# Patient Record
Sex: FEMALE | Race: OTHER | Employment: FULL TIME | URBAN - METROPOLITAN AREA
[De-identification: names, ages, dates, MRNs, and addresses within clinical notes are randomized per-mention and may not be internally consistent; named-entity substitution may affect disease eponyms.]

---

## 2017-05-18 DIAGNOSIS — R51.9 HEADACHE, CHRONIC DAILY: Chronic | ICD-10-CM

## 2017-05-18 DIAGNOSIS — G43.109 MIGRAINE WITH AURA AND WITHOUT STATUS MIGRAINOSUS, NOT INTRACTABLE: Chronic | ICD-10-CM

## 2017-05-19 DIAGNOSIS — G43.109 MIGRAINE WITH AURA AND WITHOUT STATUS MIGRAINOSUS, NOT INTRACTABLE: Chronic | ICD-10-CM

## 2017-05-20 RX ORDER — SUMATRIPTAN 25 MG/1
TABLET, FILM COATED ORAL
Qty: 12 TABLET | Refills: 2 | Status: SHIPPED | OUTPATIENT
Start: 2017-05-20 | End: 2020-01-23

## 2017-05-20 RX ORDER — SUMATRIPTAN 25 MG/1
25 TABLET, FILM COATED ORAL
Qty: 12 TABLET | Refills: 2 | Status: SHIPPED | OUTPATIENT
Start: 2017-05-20 | End: 2018-11-07 | Stop reason: SDUPTHER

## 2017-05-20 RX ORDER — AMITRIPTYLINE HYDROCHLORIDE 25 MG/1
37.5 TABLET, FILM COATED ORAL NIGHTLY
Qty: 45 TABLET | Refills: 4 | Status: SHIPPED | OUTPATIENT
Start: 2017-05-20 | End: 2017-11-17 | Stop reason: SDUPTHER

## 2017-05-24 LAB
CHOLESTEROL/HDL RATIO: 3.8
CHOLESTEROL: 191 MG/DL
GLUCOSE BLD-MCNC: 93 MG/DL (ref 70–99)
HDLC SERPL-MCNC: 50 MG/DL
LDL CHOLESTEROL: 127 MG/DL (ref 0–130)
PATIENT FASTING?: YES
TRIGL SERPL-MCNC: 72 MG/DL
VLDLC SERPL CALC-MCNC: NORMAL MG/DL (ref 1–30)

## 2017-05-25 ENCOUNTER — EMPLOYEE WELLNESS (OUTPATIENT)
Dept: OTHER | Age: 37
End: 2017-05-25

## 2017-09-05 RX ORDER — NORGESTREL AND ETHINYL ESTRADIOL 0.3-0.03MG
KIT ORAL
Qty: 28 TABLET | Refills: 0 | Status: SHIPPED | OUTPATIENT
Start: 2017-09-05 | End: 2017-10-04 | Stop reason: SDUPTHER

## 2017-10-04 RX ORDER — NORGESTREL AND ETHINYL ESTRADIOL 0.3-0.03MG
KIT ORAL
Qty: 28 TABLET | Refills: 0 | Status: SHIPPED | OUTPATIENT
Start: 2017-10-04 | End: 2017-11-02 | Stop reason: SDUPTHER

## 2017-11-02 RX ORDER — NORGESTREL AND ETHINYL ESTRADIOL 0.3-0.03MG
KIT ORAL
Qty: 28 TABLET | Refills: 0 | Status: SHIPPED | OUTPATIENT
Start: 2017-11-02 | End: 2018-01-24 | Stop reason: ALTCHOICE

## 2017-11-09 NOTE — PROGRESS NOTES
Subjective:      Patient ID: Santana Powell is a 44 y.o. female. HPI  Presents for annual pap no complaints   Allergy :None  Med hx: fatigue   Surgery : Tympanoplasty, Colonoscopy, Lasik, Hemorrhoids     Review of Systems   Constitutional: Negative for chills, fatigue and fever. HENT: Negative for sinus pressure, sneezing, sore throat, tinnitus, trouble swallowing and voice change. Eyes: Negative for photophobia and visual disturbance. Respiratory: Negative for cough, shortness of breath, wheezing and stridor. Cardiovascular: Negative for chest pain, palpitations and leg swelling. Gastrointestinal: Negative for abdominal distention, abdominal pain, constipation, diarrhea, nausea and vomiting. Endocrine: Negative for polydipsia, polyphagia and polyuria. Genitourinary: Negative for decreased urine volume, difficulty urinating, dyspareunia, dysuria, enuresis, frequency, genital sores, hematuria, menstrual problem, pelvic pain, vaginal bleeding, vaginal discharge and vaginal pain. Musculoskeletal: Negative for arthralgias, back pain, gait problem, joint swelling and myalgias. Skin: Negative for color change, pallor and rash. Neurological: Negative for dizziness, tremors, syncope, facial asymmetry, speech difficulty, light-headedness, numbness and headaches. Hematological: Negative for adenopathy. Does not bruise/bleed easily. Psychiatric/Behavioral: Negative for agitation, behavioral problems, confusion, decreased concentration, dysphoric mood, hallucinations, self-injury, sleep disturbance and suicidal ideas. The patient is not nervous/anxious and is not hyperactive. Objective:   Physical Exam   Constitutional: She is oriented to person, place, and time. She appears well-developed and well-nourished. HENT:   Head: Normocephalic. Eyes: Conjunctivae are normal. Pupils are equal, round, and reactive to light. Neck: Normal range of motion. No thyromegaly present.    Cardiovascular:

## 2017-11-14 ENCOUNTER — OFFICE VISIT (OUTPATIENT)
Dept: OBGYN CLINIC | Age: 37
End: 2017-11-14
Payer: COMMERCIAL

## 2017-11-14 ENCOUNTER — HOSPITAL ENCOUNTER (OUTPATIENT)
Age: 37
Setting detail: SPECIMEN
Discharge: HOME | End: 2017-11-14
Payer: COMMERCIAL

## 2017-11-14 VITALS
HEART RATE: 76 BPM | WEIGHT: 209 LBS | SYSTOLIC BLOOD PRESSURE: 122 MMHG | BODY MASS INDEX: 29.15 KG/M2 | DIASTOLIC BLOOD PRESSURE: 68 MMHG | RESPIRATION RATE: 18 BRPM

## 2017-11-14 DIAGNOSIS — Z12.31 VISIT FOR SCREENING MAMMOGRAM: ICD-10-CM

## 2017-11-14 DIAGNOSIS — Z01.419 PAP SMEAR, AS PART OF ROUTINE GYNECOLOGICAL EXAMINATION: Primary | ICD-10-CM

## 2017-11-14 PROCEDURE — 99395 PREV VISIT EST AGE 18-39: CPT | Performed by: NURSE PRACTITIONER

## 2017-11-14 ASSESSMENT — ENCOUNTER SYMPTOMS
SORE THROAT: 0
STRIDOR: 0
BACK PAIN: 0
ABDOMINAL DISTENTION: 0
SINUS PRESSURE: 0
NAUSEA: 0
COUGH: 0
SHORTNESS OF BREATH: 0
WHEEZING: 0
CONSTIPATION: 0
VOMITING: 0
PHOTOPHOBIA: 0
ABDOMINAL PAIN: 0
VOICE CHANGE: 0
DIARRHEA: 0
TROUBLE SWALLOWING: 0
COLOR CHANGE: 0

## 2017-11-16 LAB
HPV SAMPLE: NORMAL
HPV SOURCE: NORMAL
HPV, GENOTYPE 16: NOT DETECTED
HPV, GENOTYPE 18: NOT DETECTED
HPV, HIGH RISK OTHER: NOT DETECTED
HPV, INTERPRETATION: NORMAL

## 2017-11-17 DIAGNOSIS — R51.9 HEADACHE, CHRONIC DAILY: Chronic | ICD-10-CM

## 2017-11-17 RX ORDER — AMITRIPTYLINE HYDROCHLORIDE 25 MG/1
37.5 TABLET, FILM COATED ORAL NIGHTLY
Qty: 45 TABLET | Refills: 4 | Status: SHIPPED | OUTPATIENT
Start: 2017-11-17 | End: 2018-01-18 | Stop reason: SDUPTHER

## 2017-11-22 LAB — CYTOLOGY REPORT: NORMAL

## 2017-12-18 ENCOUNTER — HOSPITAL ENCOUNTER (OUTPATIENT)
Dept: MAMMOGRAPHY | Age: 37
Discharge: HOME | End: 2017-12-18
Payer: COMMERCIAL

## 2017-12-18 ENCOUNTER — HOSPITAL ENCOUNTER (OUTPATIENT)
Age: 37
Discharge: HOME | End: 2017-12-18
Payer: COMMERCIAL

## 2017-12-18 DIAGNOSIS — Z12.31 VISIT FOR SCREENING MAMMOGRAM: ICD-10-CM

## 2017-12-18 PROCEDURE — 77063 BREAST TOMOSYNTHESIS BI: CPT

## 2017-12-19 ENCOUNTER — TELEPHONE (OUTPATIENT)
Dept: OBGYN CLINIC | Age: 37
End: 2017-12-19

## 2018-01-18 DIAGNOSIS — R51.9 HEADACHE, CHRONIC DAILY: Primary | Chronic | ICD-10-CM

## 2018-01-18 RX ORDER — AMITRIPTYLINE HYDROCHLORIDE 25 MG/1
37.5 TABLET, FILM COATED ORAL NIGHTLY
Qty: 135 TABLET | Refills: 1 | Status: SHIPPED | OUTPATIENT
Start: 2018-01-18 | End: 2018-08-13 | Stop reason: SDUPTHER

## 2018-01-24 ENCOUNTER — OFFICE VISIT (OUTPATIENT)
Dept: FAMILY MEDICINE CLINIC | Age: 38
End: 2018-01-24
Payer: COMMERCIAL

## 2018-01-24 VITALS
SYSTOLIC BLOOD PRESSURE: 124 MMHG | TEMPERATURE: 100 F | OXYGEN SATURATION: 98 % | WEIGHT: 211 LBS | DIASTOLIC BLOOD PRESSURE: 80 MMHG | HEART RATE: 105 BPM | BODY MASS INDEX: 30.21 KG/M2 | HEIGHT: 70 IN

## 2018-01-24 DIAGNOSIS — R52 BODY ACHES: Primary | ICD-10-CM

## 2018-01-24 DIAGNOSIS — J06.9 VIRAL URI: ICD-10-CM

## 2018-01-24 PROCEDURE — 87804 INFLUENZA ASSAY W/OPTIC: CPT | Performed by: NURSE PRACTITIONER

## 2018-01-24 PROCEDURE — 99213 OFFICE O/P EST LOW 20 MIN: CPT | Performed by: NURSE PRACTITIONER

## 2018-01-24 ASSESSMENT — ENCOUNTER SYMPTOMS
SINUS PRESSURE: 0
VISUAL CHANGE: 0
RHINORRHEA: 0
VOMITING: 0
NAUSEA: 0
CHEST TIGHTNESS: 0
DIARRHEA: 0
EYES NEGATIVE: 1
SINUS PAIN: 0
FLU SYMPTOMS: 1
SHORTNESS OF BREATH: 0
SWOLLEN GLANDS: 0
SORE THROAT: 1
EYE DISCHARGE: 0
COUGH: 1
ALLERGIC/IMMUNOLOGIC NEGATIVE: 1
ABDOMINAL PAIN: 0
EYE ITCHING: 0
CHANGE IN BOWEL HABIT: 0

## 2018-01-24 ASSESSMENT — PATIENT HEALTH QUESTIONNAIRE - PHQ9
SUM OF ALL RESPONSES TO PHQ9 QUESTIONS 1 & 2: 0
1. LITTLE INTEREST OR PLEASURE IN DOING THINGS: 0
SUM OF ALL RESPONSES TO PHQ QUESTIONS 1-9: 0
2. FEELING DOWN, DEPRESSED OR HOPELESS: 0

## 2018-03-20 VITALS — BODY MASS INDEX: 29.43 KG/M2 | WEIGHT: 211 LBS

## 2018-03-28 ENCOUNTER — EMPLOYEE WELLNESS (OUTPATIENT)
Dept: OTHER | Age: 38
End: 2018-03-28

## 2018-03-28 LAB
CHOLESTEROL/HDL RATIO: 3.2
CHOLESTEROL: 191 MG/DL
GLUCOSE BLD-MCNC: 93 MG/DL (ref 70–99)
HDLC SERPL-MCNC: 60 MG/DL
LDL CHOLESTEROL: 121 MG/DL (ref 0–130)
PATIENT FASTING?: NORMAL
TRIGL SERPL-MCNC: 51 MG/DL
VLDLC SERPL CALC-MCNC: NORMAL MG/DL (ref 1–30)

## 2018-04-02 VITALS — WEIGHT: 207 LBS | BODY MASS INDEX: 29.7 KG/M2

## 2018-04-24 ENCOUNTER — OFFICE VISIT (OUTPATIENT)
Dept: OBGYN CLINIC | Age: 38
End: 2018-04-24
Payer: COMMERCIAL

## 2018-04-24 ENCOUNTER — HOSPITAL ENCOUNTER (OUTPATIENT)
Age: 38
Setting detail: SPECIMEN
Discharge: HOME | End: 2018-04-24
Payer: COMMERCIAL

## 2018-04-24 VITALS
BODY MASS INDEX: 29.85 KG/M2 | RESPIRATION RATE: 16 BRPM | DIASTOLIC BLOOD PRESSURE: 70 MMHG | HEIGHT: 70 IN | SYSTOLIC BLOOD PRESSURE: 132 MMHG | WEIGHT: 208.5 LBS

## 2018-04-24 DIAGNOSIS — N94.9 GENITAL LESION, FEMALE: Primary | ICD-10-CM

## 2018-04-24 PROCEDURE — 99213 OFFICE O/P EST LOW 20 MIN: CPT | Performed by: NURSE PRACTITIONER

## 2018-04-24 ASSESSMENT — ENCOUNTER SYMPTOMS
SORE THROAT: 0
BACK PAIN: 0
COUGH: 0
SHORTNESS OF BREATH: 0
TROUBLE SWALLOWING: 0
STRIDOR: 0
NAUSEA: 0
CONSTIPATION: 0
COLOR CHANGE: 0
ABDOMINAL DISTENTION: 0
ABDOMINAL PAIN: 0
DIARRHEA: 0
VOMITING: 0
VOICE CHANGE: 0

## 2018-04-25 ENCOUNTER — TELEPHONE (OUTPATIENT)
Dept: OBGYN CLINIC | Age: 38
End: 2018-04-25

## 2018-04-25 DIAGNOSIS — N94.9 GENITAL LESION, FEMALE: Primary | ICD-10-CM

## 2018-04-25 RX ORDER — CLOBETASOL PROPIONATE 0.5 MG/G
CREAM TOPICAL
Qty: 1 TUBE | Refills: 0 | Status: SHIPPED | OUTPATIENT
Start: 2018-04-25 | End: 2020-01-23

## 2018-04-29 LAB
CULTURE: NORMAL
Lab: NORMAL
SPECIMEN DESCRIPTION: NORMAL
STATUS: NORMAL

## 2018-08-13 DIAGNOSIS — R51.9 HEADACHE, CHRONIC DAILY: Chronic | ICD-10-CM

## 2018-08-13 RX ORDER — AMITRIPTYLINE HYDROCHLORIDE 25 MG/1
37.5 TABLET, FILM COATED ORAL NIGHTLY
Qty: 135 TABLET | Refills: 1 | Status: SHIPPED | OUTPATIENT
Start: 2018-08-13 | End: 2019-02-08 | Stop reason: SDUPTHER

## 2018-09-05 RX ORDER — NORETHINDRONE AND ETHINYL ESTRADIOL 1 MG-35MCG
KIT ORAL
Qty: 28 TABLET | Refills: 11 | Status: SHIPPED | OUTPATIENT
Start: 2018-09-05 | End: 2020-01-23

## 2018-11-07 DIAGNOSIS — G43.109 MIGRAINE WITH AURA AND WITHOUT STATUS MIGRAINOSUS, NOT INTRACTABLE: Chronic | ICD-10-CM

## 2018-11-07 RX ORDER — SUMATRIPTAN 25 MG/1
25 TABLET, FILM COATED ORAL
Qty: 12 TABLET | Refills: 3 | Status: SHIPPED | OUTPATIENT
Start: 2018-11-07 | End: 2020-01-23

## 2018-11-19 ENCOUNTER — OFFICE VISIT (OUTPATIENT)
Dept: OBGYN CLINIC | Age: 38
End: 2018-11-19
Payer: COMMERCIAL

## 2018-11-19 ENCOUNTER — HOSPITAL ENCOUNTER (OUTPATIENT)
Age: 38
Setting detail: SPECIMEN
Discharge: HOME | End: 2018-11-19
Payer: COMMERCIAL

## 2018-11-19 VITALS
RESPIRATION RATE: 16 BRPM | DIASTOLIC BLOOD PRESSURE: 60 MMHG | SYSTOLIC BLOOD PRESSURE: 112 MMHG | WEIGHT: 206 LBS | HEIGHT: 70 IN | BODY MASS INDEX: 29.49 KG/M2

## 2018-11-19 DIAGNOSIS — Z01.419 PAP SMEAR, AS PART OF ROUTINE GYNECOLOGICAL EXAMINATION: Primary | ICD-10-CM

## 2018-11-19 DIAGNOSIS — Z12.31 VISIT FOR SCREENING MAMMOGRAM: ICD-10-CM

## 2018-11-19 PROCEDURE — 99395 PREV VISIT EST AGE 18-39: CPT | Performed by: NURSE PRACTITIONER

## 2018-11-19 ASSESSMENT — ENCOUNTER SYMPTOMS
SORE THROAT: 0
SHORTNESS OF BREATH: 0
COUGH: 0
VOMITING: 0
CONSTIPATION: 0
TROUBLE SWALLOWING: 0
WHEEZING: 0
BACK PAIN: 0
PHOTOPHOBIA: 0
STRIDOR: 0
ABDOMINAL PAIN: 0
DIARRHEA: 0
COLOR CHANGE: 0
NAUSEA: 0
ABDOMINAL DISTENTION: 0
VOICE CHANGE: 0

## 2018-12-04 LAB — CYTOLOGY REPORT: NORMAL

## 2018-12-20 ENCOUNTER — HOSPITAL ENCOUNTER (OUTPATIENT)
Dept: MAMMOGRAPHY | Age: 38
Discharge: HOME | End: 2018-12-22

## 2018-12-20 DIAGNOSIS — Z12.31 VISIT FOR SCREENING MAMMOGRAM: ICD-10-CM

## 2018-12-20 PROCEDURE — 77067 SCR MAMMO BI INCL CAD: CPT

## 2019-02-08 DIAGNOSIS — R51.9 HEADACHE, CHRONIC DAILY: Chronic | ICD-10-CM

## 2019-02-08 RX ORDER — AMITRIPTYLINE HYDROCHLORIDE 25 MG/1
37.5 TABLET, FILM COATED ORAL NIGHTLY
Qty: 135 TABLET | Refills: 1 | Status: SHIPPED | OUTPATIENT
Start: 2019-02-08 | End: 2019-08-12 | Stop reason: SDUPTHER

## 2019-04-25 ENCOUNTER — EMPLOYEE WELLNESS (OUTPATIENT)
Dept: OTHER | Age: 39
End: 2019-04-25

## 2019-04-25 LAB
CHOLESTEROL/HDL RATIO: 3.6
CHOLESTEROL: 197 MG/DL
GLUCOSE BLD-MCNC: 96 MG/DL (ref 70–99)
HDLC SERPL-MCNC: 55 MG/DL
LDL CHOLESTEROL: 129 MG/DL (ref 0–130)
PATIENT FASTING?: NORMAL
TRIGL SERPL-MCNC: 65 MG/DL
VLDLC SERPL CALC-MCNC: NORMAL MG/DL (ref 1–30)

## 2019-05-06 VITALS — WEIGHT: 208 LBS | BODY MASS INDEX: 29.84 KG/M2

## 2019-08-12 ENCOUNTER — OFFICE VISIT (OUTPATIENT)
Dept: PEDIATRIC NEUROLOGY | Age: 39
End: 2019-08-12
Payer: COMMERCIAL

## 2019-08-12 VITALS
HEIGHT: 70 IN | DIASTOLIC BLOOD PRESSURE: 97 MMHG | WEIGHT: 209.4 LBS | SYSTOLIC BLOOD PRESSURE: 139 MMHG | HEART RATE: 88 BPM | BODY MASS INDEX: 29.98 KG/M2

## 2019-08-12 DIAGNOSIS — R51.9 HEADACHE, CHRONIC DAILY: Chronic | ICD-10-CM

## 2019-08-12 DIAGNOSIS — G43.109 MIGRAINE WITH AURA AND WITHOUT STATUS MIGRAINOSUS, NOT INTRACTABLE: Chronic | ICD-10-CM

## 2019-08-12 PROCEDURE — 99211 OFF/OP EST MAY X REQ PHY/QHP: CPT | Performed by: NURSE PRACTITIONER

## 2019-08-12 PROCEDURE — 99213 OFFICE O/P EST LOW 20 MIN: CPT | Performed by: NURSE PRACTITIONER

## 2019-08-12 RX ORDER — AMITRIPTYLINE HYDROCHLORIDE 25 MG/1
37.5 TABLET, FILM COATED ORAL NIGHTLY
Qty: 135 TABLET | Refills: 1 | Status: SHIPPED | OUTPATIENT
Start: 2019-08-12 | End: 2020-01-23

## 2019-08-12 NOTE — LETTER
able to do her daily activities and this does result in interruption of work or other activities at home. There is no associated light or sound sensitivity or neurological deficits with this headache. This type of headache is reported to be constant, with dull tension type of pain. MIGRAINES ASSOCIATED WITH VISION LOSS:  Randy Salas continues to have intermittent migraines 1-2 times every 6 months. Date is unknown of her last migraine with vision loss. Randy Salas approximates the last migraine to occur in between 3-6 months ago. Randy Salas reports that she will take Imitrex when she notices vision changes. She reports seeing a grey spot in her right eye during these episodes. If she take the Imitrex as soon as these changes occur, she is able to abort her migraine. Migraine description provided below:      MIGRAINE DESCRIPTION:  She describe the pain to involve the bifrontal and temporal regions at an intensity of 8/10. Prior to the headache, she has had visual aura consisting of seeing flashing lights. Nausea or vomiting does not accompany the migraines. Randy Salas reports that initially she sees a small black spot, and this gradually gets larger with more vision loss, more so on the left side. She states that sometimes she can stop the progression of vision loss if she takes Aleve right away. She will prefer to go and sleep in a dark, quiet room and rest. She has needed to take 10-20 minute breaks from work at times. There is no associated numbness, tingling or weakness with these headaches. Rest gives partial relief of these headaches. Previous Medications Tried: Topamax (ineffective, irregular menses)     REVIEW OF SYSTEMS:  Constitutional: Negative. Eyes: Negative. Respiratory: Negative. Cardiovascular: Negative. Gastrointestinal: Negative. Genitourinary: Negative. Musculoskeletal: Neck stiffness    Skin: Negative.    Neurological: positive for headaches and migraines, negative for seizures,

## 2019-08-12 NOTE — PROGRESS NOTES
It was a pleasure to see Lacy Yuki at the [facility] at [facility]. She is a 43 y.o. female  to this visit for a follow up neurological evaluation. INTERIM PROGRESS:  FATIGUE:  Cherlynn Closs states that she has noticed an overall improvement with her Fatigue. She reports that the fatigue has been manageable on Elavil. She states that since starting the medication she has been able to overcome the exhaustion and has been more motivated. She has been exercising several times per week. Cherlynn Closs reports that she has been sleeping well at least 8 hours a night. Cherlynn Closs reports that she can have [employer] fog\" on some occasions. It is to be recalled that a  previous sleep study revealed frequent arousals throughout the night without any significant apnea associated. Cherlynn Closs reports that she continues to have upper back and neck pain on some occasions. She continues to use massage therapy in this regard. She has seen rheumatology in this regard, which did not reveal a clear etiology into her diagnosis. Cherlynn Closs remains on Elavil with no reported side effects. HEADACHES:   Cherlynn Closs states that she has 1-2 a month or less. This is an improvement from in the past when she would have headaches 3-4 times a week. Cherlynn Closs states that she will take Naprosyn and rest which will typically provide relief. Headache description below:        HEADACHE DESCRIPTION:   These headaches are of a low-moderate intensity, pressure type, occurring in the bifrontal, temporal regions as well as neck and back pain. She is able to do her daily activities and this does result in interruption of work or other activities at home. There is no associated light or sound sensitivity or neurological deficits with this headache. This type of headache is reported to be constant, with dull tension type of pain. MIGRAINES ASSOCIATED WITH VISION LOSS:  Cherlynn Closs continues to have intermittent migraines 1-2 times every 6 months.  Date 30.08 kg/m²     Neurological: she is alert and has normal strength and normal reflexes. she displays no atrophy, no tremor and normal reflexes. No cranial nerve deficit or sensory deficit. she exhibits normal muscle tone. she can stand and walk. she displays no seizure activity. Rudy Pichardo was polite and cooperative for the exam.     Reflex Scores: 2+ diffuse. No focal weakness noted on exam.     Nursing note and vitals reviewed. Constitutional: she appears well-developed and well-nourished. HENT: Mouth/Throat: Mucous membranes are moist.   Eyes: EOM are normal. Pupils are equal, round, and reactive to light. Neck: Normal range of motion. Neck supple. Cardiovascular: Regular rhythm, S1 normal and S2 normal.   Pulmonary/Chest: Effort normal and breath sounds normal.   Lymph Nodes: No significant lymphadenopathy noted. Musculoskeletal: Normal range of motion. Neurological: she is alert and rest of the exam is as mentioned above. Skin: Skin is warm and dry. No lesions or ulcers. RECORD REVIEW: Previous medical records were reviewed at today's visit. DIAGNOSTIC STUDIES:  8/21/14 - MRI Brain - Normal    Ref.  Range 8/8/2014 08:21   Sodium Latest Range: 133-142 mmol/L 134   Potassium Latest Range: 3.5-5.1 mmol/L 4.2   Chloride Latest Range:  mmol/L 98   CO2 Latest Range: 20-31 mmol/L 22   Anion Gap Latest Range: 8-16 mmol/L 18 (H)   Glucose Latest Range: 70-99 mg/dL 93   BUN Latest Range: 6-20 mg/dL 9   Creatinine Latest Range: 0.50-0.90 mg/dL 0.67   Alk Phos Latest Range:  U/L 75   Calcium Latest Range: 8.6-10.0 mg/dL 9.5   Total Protein Latest Range: 6.4-8.3 g/dL 7.7   Albumin Latest Range: 3.5-5.2 g/dL 4.1   ALT Latest Range: 5-33 U/L 18   AST Latest Range: <32 U/L 21   Bilirubin, Direct Latest Range: <0.31 mg/dL 0.10   Bilirubin, Indirect Latest Range: 0.00-1.00 mg/dL 0.30   Bilirubin Latest Range: 0.3-1.2 mg/dL 0.40   Albumin/Globulin Ratio Latest Range: 1.7-2.5  1.1 (L)   Chol/HDL Ratio Latest Range: <5  4.1   HDL Cholesterol Latest Range: >40 mg/dL 52   LDL Cholesterol Latest Range: 0-130 mg/dL 137 (H)   Triglycerides Latest Range: <150 mg/dL 123   Cholesterol Latest Range: <200 mg/dL 214 (H)   Ferritin Latest Range:  ug/L 56   Vit D, 1,25-Dihydroxy Latest Range: 15-75 pg/mL 119 (H)   T3, Free Latest Range: 2.02-4.43 pg/mL 3.97   Thyroxine, Free Latest Range: 0.93-1.70 ng/dL 1.05   TSH Latest Range: 0.30-5.00 mIU/L 2.17   WBC Latest Range: 3.5-11.0 k/uL 7.9   RBC Latest Range: 4.0-5.2 m/uL 4.59   Hemoglobin Quant Latest Range: 12.0-16.0 g/dL 13.1   Hematocrit Latest Range: 36-46 % 38.5   RDW Latest Range: 12.5-15.4 % 13.8   Platelet Count Latest Range: 140-450 k/uL 277   Sed Rate Latest Range: 0-20 mm 18   [facility] Latest Range: NEG  POSITIVE (A)   Anti ds DNA Latest Range: <100 IU/mL 141 (H)   Histone AB Latest Range: <100 U/mL 45   Anti RM-1 Latest Range: <100 U/mL 19   Anti-Smith Latest Range: <100 U/mL 22   Rheumatoid Factor Latest Range: <14  <10.0   Anti SSA Latest Range: <100 U/mL 33   Anti SSB Latest Range: <100 U/mL 18   Anti-Scleroderma Latest Range: <100 U/mL 71   Anti RNP Latest Range: <100 U/mL 35   [facility] Reference Range: No range found Pend   Anti-Centromere Latest Range: <100 U/mL 9   IGA Latest Range:  mg/dL 148   Total IgG Latest Range: 700-1600 mg/dL 1439   IgM Latest Range:  mg/dL 203      Assessment:   Ijeoma Hernandez is a 27 y.o. female with:   1. Fatigue, lack of concentration, and persistent feeling of delayed thought processing. The fatigue has improved and I will continue to have a watchful approach. 2. Chronic daily tension related headaches which have shown improvement on Elavil. 3. Migraines with aura (associated with partial vision loss). The Imitrex helps but it takes around 20 minutes for this migraine to get aborted. 4. Concentration Deficit issues on some occasions. 5. Upper back region muscle tightness      Plan:   1.  Continue Amitriptyline  37.5 mg

## 2019-10-26 ENCOUNTER — OFFICE VISIT (OUTPATIENT)
Dept: FAMILY MEDICINE CLINIC | Age: 39
End: 2019-10-26
Payer: COMMERCIAL

## 2019-10-26 VITALS — HEART RATE: 76 BPM | TEMPERATURE: 97.7 F | SYSTOLIC BLOOD PRESSURE: 137 MMHG | DIASTOLIC BLOOD PRESSURE: 96 MMHG

## 2019-10-26 DIAGNOSIS — J01.90 ACUTE BACTERIAL SINUSITIS: Primary | ICD-10-CM

## 2019-10-26 DIAGNOSIS — B96.89 ACUTE BACTERIAL SINUSITIS: Primary | ICD-10-CM

## 2019-10-26 PROCEDURE — 99213 OFFICE O/P EST LOW 20 MIN: CPT | Performed by: PHYSICIAN ASSISTANT

## 2019-10-26 RX ORDER — AMOXICILLIN 875 MG/1
875 TABLET, COATED ORAL 2 TIMES DAILY
Qty: 20 TABLET | Refills: 0 | Status: SHIPPED | OUTPATIENT
Start: 2019-10-26 | End: 2021-05-18

## 2019-10-26 RX ORDER — FLUTICASONE PROPIONATE 50 MCG
2 SPRAY, SUSPENSION (ML) NASAL DAILY
Qty: 1 BOTTLE | Refills: 0 | Status: SHIPPED | OUTPATIENT
Start: 2019-10-26 | End: 2020-01-23

## 2019-10-26 RX ORDER — PREDNISONE 20 MG/1
20 TABLET ORAL 2 TIMES DAILY
Qty: 10 TABLET | Refills: 0 | Status: SHIPPED | OUTPATIENT
Start: 2019-10-26 | End: 2019-10-31

## 2019-10-31 ASSESSMENT — ENCOUNTER SYMPTOMS
SORE THROAT: 1
RHINORRHEA: 1
CHEST TIGHTNESS: 0
STRIDOR: 0
EYES NEGATIVE: 1
SINUS PRESSURE: 1
SHORTNESS OF BREATH: 0
COUGH: 1
GASTROINTESTINAL NEGATIVE: 1
WHEEZING: 0
SINUS PAIN: 1

## 2020-01-23 ENCOUNTER — HOSPITAL ENCOUNTER (OUTPATIENT)
Age: 40
Setting detail: SPECIMEN
Discharge: HOME | End: 2020-01-23
Payer: COMMERCIAL

## 2020-01-23 ENCOUNTER — OFFICE VISIT (OUTPATIENT)
Dept: OBGYN CLINIC | Age: 40
End: 2020-01-23
Payer: COMMERCIAL

## 2020-01-23 VITALS — WEIGHT: 204 LBS | SYSTOLIC BLOOD PRESSURE: 120 MMHG | DIASTOLIC BLOOD PRESSURE: 64 MMHG | BODY MASS INDEX: 29.3 KG/M2

## 2020-01-23 PROCEDURE — 99396 PREV VISIT EST AGE 40-64: CPT | Performed by: NURSE PRACTITIONER

## 2020-01-23 ASSESSMENT — ENCOUNTER SYMPTOMS
COLOR CHANGE: 0
SHORTNESS OF BREATH: 0
DIARRHEA: 0
NAUSEA: 0
RHINORRHEA: 0
CONSTIPATION: 0
COUGH: 0
VOMITING: 0
ABDOMINAL PAIN: 0
BACK PAIN: 0

## 2020-01-23 NOTE — PATIENT INSTRUCTIONS
Patient Education      Patient Education        Learning About Breast Cancer Screening  What is breast cancer screening? Breast cancer occurs when cells that are not normal grow in one or both of your breasts. Screening tests can help find breast cancer early. Cancer is easier to treat when it's found early. Having concerns about breast cancer is common. That's why it's important to talk with your doctor about when to start and how often to get screened for breast cancer. How is breast cancer screening done? Several screening tests can be used to check for breast cancer. · Mammograms check for signs of cancer using X-rays. They can show tumors that are too small for you or your doctor to feel. During a mammogram, a machine squeezes your breasts to make them flatter and easier to X-ray. At least two pictures are taken of each breast. One is taken from the top and one from the side. · 3-D mammograms are also called digital breast tomosynthesis. Your breast is positioned on a flat plate. A top plate is pressed against your breast to keep it in position. The X-ray arm then moves in an arc above the breast and takes many pictures. A computer uses these X-rays to create a three-dimensional image. · Clinical breast exams are a doctor's exam. Your doctor carefully feels your breasts and under your arms to check for lumps or other changes. After the screening, your doctor will tell you the results. You will also be told if you need any follow-up tests. When should you get screened? Talk with your doctor about when you should start being tested for breast cancer. How often you get tested and the kind of tests you get will depend on your age and your risk. The guidelines that follow are for women who have an average risk for breast cancer.  If you have a higher risk for breast cancer, such as having a family history of breast cancer in multiple relatives or at a young age, your doctor may recommend different screening for you. · Ages 21 to 44: Some experts recommend that women have a clinical breast exam every 3 years, starting at age 21. Ask your doctor how often you should have this test. If you have a high risk for breast cancer, talk with your doctor about when to start yearly mammograms and other screening tests. · Ages 36 and older: Talk with your doctor about how often you should have mammograms and clinical breast exams. What is your risk for breast cancer? If you don't already know your risk of breast cancer, you can ask your doctor about it. You can also look it up at www.cancer.gov/bcrisktool/. If your doctor says that you have a high or very high risk, ask about ways to reduce your risk. These could include getting extra screening, taking medicine, or having surgery. If you have a strong family history of breast cancer, ask your doctor about genetic testing. What steps can you take to stay healthy? Some things that increase your risk of breast cancer, such as your age and being female, cannot be controlled. But you can do some things to stay as healthy as you can. · Learn what your breasts normally look and feel like. If you notice any changes, tell your doctor. · If you drink alcohol, limit how much you drink. Any amount of alcohol may increase your risk for some types of cancer. · If you smoke, quit. When you quit smoking, you lower your chances of getting many types of cancer. You can also do your best to eat well, be active, and stay at a healthy weight. Eating healthy foods and being active every day, as well as staying at a healthy weight, may help prevent cancer. Where can you learn more? Go to [email or web address].[email or web address] org and sign in to your Adan account. Enter C424 in the Quantuvis box to learn more about \"Learning About Breast Cancer Screening. \"     If you do not have an account, please click on the \"Sign Up Now\" link.   Current as of: August 21, cervix. Sometimes the lab cannot use the sample because it does not contain enough cells or was not preserved well. If so, you may need to have the test again. This is not common, but it does happen from time to time. When should you call for help? Watch closely for changes in your health, and be sure to contact your doctor if:    · You have vaginal bleeding or pain for more than 2 days after the test. It is normal to have a small amount of bleeding for a day or two after the test.   Where can you learn more? Go to [email or web address] and sign in to your SyMynd account. Enter G329 in the Cellity box to learn more about \"Pap Test: Care Instructions. \"     If you do not have an account, please click on the \"Sign Up Now\" link. Current as of: August 21, 2019  Content Version: 12.3  © 0096-1518 Healthwise, Incorporated. Care instructions adapted under license by [facility] ([facility]). If you have questions about a medical condition or this instruction, always ask your healthcare professional. [address] any warranty or liability for your use of this information.

## 2020-01-23 NOTE — PROGRESS NOTES
Lawerence Prader is a 35 y.o.  here for her annual exam.  The patient was seen and examined. The patients past medical, surgical, social and family history were reviewed. Current medications and allergies were reviewed, and documented in the chart. 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 She is engaged. Exercise Yes  Diet Yes  Tobacco abuse No    Last PAP:11/19/18-negative, hx of abnormal PAP yes - ASCUS +hpv  Family hx uterine or ovarian cancer-denies  Last mammogram- 12/20/18-negative Family hx of breast cancer -[facility]   family hx colon cancer -MGF        Sexually active: yes - with fiance, multiple partners: No, Dyspareunia: No, Vaginal discharge: no, positive itching though past few days, denies odor  UTI symptoms: no, voiding difficulties: no, bowels regular:Yes bloating:no      Menstrual history:  Menarche age- 14 ,   LMP- 1/2/20  Birth control:   She stopped her oral contraceptive in Fall 2019 thinks October or November d/t if missed one would have BTB, she states that she has had periods every 20 days since and lasting 7 days not super heavy or cramping, thought related to periods just regulating after stopping bcp. She is also somewhat concerned about her age and conceiving and is having some issues with fiphillip where she is considering talking to fertility specialist she will let s us know what she decides discussed AMA risks    denies hx of blood clot or clotting disorder. Denies chest pain or pressure, SOB, calf pain or swelling, headaches, or vision changes. OB History   No obstetric history on file.        Vitals:    01/23/20 1432   BP: 120/64   Site: Left Upper Arm   Position: Sitting   Cuff Size: Medium Adult   Weight: 204 lb (92.5 kg)       Wt Readings from Last 3 Encounters:   01/23/20 204 lb (92.5 kg)   08/12/19 209 lb 6.4 oz (95 kg)   04/25/19 208 lb (94.3 kg)     Past Medical History:   Diagnosis Date    Fatigue     Hemorrhoid Past Surgical History:   Procedure Laterality Date    COLONOSCOPY  4-1-16    hemorrhoids    EYE SURGERY Bilateral     LISIK    HEMORRHOID SURGERY  10/14/2016    hemorrhoidectomy    LASIK Bilateral     TYMPANOSTOMY TUBE PLACEMENT      x 3 school age     Family History   Problem Relation Age of Onset    High Blood Pressure Father     Schizophrenia Father     OCD Father     Macular Degen Maternal Grandmother     Colon Cancer Maternal Grandfather     Heart Attack Maternal Grandfather     Stroke Paternal Grandmother     Breast Cancer Paternal Grandmother     Heart Attack Paternal Grandfather      Social History     Tobacco Use   Smoking Status Never Smoker   Smokeless Tobacco Never Used     Social History     Substance and Sexual Activity   Alcohol Use Yes    Comment: sociably        Social History     Tobacco History     Smoking Status  Never Smoker    Smokeless Tobacco Use  Never Used          Alcohol History     Alcohol Use Status  Yes Comment  sociably          Drug Use     Drug Use Status  No          Sexual Activity     Sexually Active  Not Asked              No Known Allergies  Current Outpatient Medications   Medication Sig Dispense Refill    amoxicillin (AMOXIL) 875 MG tablet Take 1 tablet by mouth 2 times daily 20 tablet 0     No current facility-administered medications for this visit. Subjective:     Review of Systems   Constitutional: Negative for chills, fatigue, fever and unexpected weight change. HENT: Negative for congestion and rhinorrhea. Eyes: Negative for visual disturbance. Respiratory: Negative for cough and shortness of breath. Cardiovascular: Negative for chest pain, palpitations and leg swelling. Gastrointestinal: Negative for abdominal pain, constipation, diarrhea, nausea and vomiting. Endocrine: Negative for cold intolerance, heat intolerance, polydipsia and polyuria. Genitourinary: Positive for menstrual problem.  Negative for RAHUL  10/14/2016: HEMORRHOID SURGERY      Comment:  hemorrhoidectomy  No date: LASIK; Bilateral  No date: TYMPANOSTOMY TUBE PLACEMENT      Comment:  x 3 school age  Medications/Contraceptives Affecting Cytology     Combination Contraceptives - Oral Disp Start End     NORTREL 1/35, 28, 1-35 MG-MCG per tablet    28 tablet 9/5/2018     Sig: TAKE ONE TABLET BY MOUTH DAILY. Patient not taking:  Reported on 8/12/2019                 Social History    Tobacco Use      Smoking status: Never Smoker      Smokeless tobacco: Never Used       Standing Status:   Future     Standing Expiration Date:   1/23/2021     Order Specific Question:   Collection Type     Answer: Thin Prep     Order Specific Question:   Prior Abnormal Pap Test     Answer:   No     Order Specific Question:   Screening or Diagnostic     Answer:   Screening     Order Specific Question:   HPV Requested? Answer:   Yes     Order Specific Question:   High Risk Patient     Answer:   N/A     No orders of the defined types were placed in this encounter. Patient given educational materials - seepatient instructions. Discussed use, benefit, and side effects of prescribed medications. All patient questions answered. Pt voiced understanding. Reviewed health maintenance. Instructed to continue current medications, diet and exercise. Patient agreedwith treatment plan. Follow up as directed.       Electronically signed by LENNY Dominguez CNP on 1/23/2020at 3:51 PM

## 2020-01-24 LAB
CHLAMYDIA BY THIN PREP: NEGATIVE
DIRECT EXAM: NORMAL
Lab: NORMAL
N. GONORRHOEAE DNA, THIN PREP: NEGATIVE
SPECIMEN DESCRIPTION: NORMAL
SPECIMEN DESCRIPTION: NORMAL

## 2020-01-28 LAB
HPV SAMPLE: NORMAL
HPV, GENOTYPE 16: NOT DETECTED
HPV, GENOTYPE 18: NOT DETECTED
HPV, HIGH RISK OTHER: NOT DETECTED
HPV, INTERPRETATION: NORMAL
SPECIMEN DESCRIPTION: NORMAL

## 2020-02-04 LAB — CYTOLOGY REPORT: NORMAL

## 2020-03-11 ENCOUNTER — OFFICE VISIT (OUTPATIENT)
Dept: OBGYN CLINIC | Age: 40
End: 2020-03-11
Payer: COMMERCIAL

## 2020-03-11 ENCOUNTER — HOSPITAL ENCOUNTER (OUTPATIENT)
Age: 40
Setting detail: SPECIMEN
Discharge: HOME | End: 2020-03-11
Payer: COMMERCIAL

## 2020-03-11 VITALS
DIASTOLIC BLOOD PRESSURE: 72 MMHG | WEIGHT: 205.38 LBS | RESPIRATION RATE: 16 BRPM | SYSTOLIC BLOOD PRESSURE: 120 MMHG | BODY MASS INDEX: 30.42 KG/M2 | HEIGHT: 69 IN

## 2020-03-11 LAB
DIRECT EXAM: ABNORMAL
Lab: ABNORMAL
SPECIMEN DESCRIPTION: ABNORMAL

## 2020-03-11 PROCEDURE — 99213 OFFICE O/P EST LOW 20 MIN: CPT | Performed by: NURSE PRACTITIONER

## 2020-03-11 ASSESSMENT — ENCOUNTER SYMPTOMS
VOMITING: 0
SHORTNESS OF BREATH: 0
COLOR CHANGE: 0
NAUSEA: 0
CHEST TIGHTNESS: 0

## 2020-03-11 NOTE — PROGRESS NOTES
[facility] PHYSICIANS  [facility] [address]  [address] [address]  Dept: [phone]  Dept Fax: [phone]    Lavelle Roa is a 35 y.o. female who presents today for her medical conditions/complaintsas noted below. Lavelle Roa is c/o of Vaginal Discharge        HPI:     HPI  Pt is here with complaints of a vaginal discharge for around 2 weeks  with no Odor, no  Itching,  positive Burning. No UTI sx, no pelvic pain, fevers chills or n/v.  Did note lesion/bumps to left labial area seem to have improved. She tried monistat OTC helped slightly then returned. Denies hx STI   No recent antibiotics, changes in soaps. No change in partners- denies partner having symptoms. LMP- 3.5 -4 weeks ago      Past Medical History:   Diagnosis Date    Fatigue     Hemorrhoid       Past Surgical History:   Procedure Laterality Date    COLONOSCOPY  4-1-16    hemorrhoids    EYE SURGERY Bilateral     LISIK    HEMORRHOID SURGERY  10/14/2016    hemorrhoidectomy    LASIK Bilateral     TYMPANOSTOMY TUBE PLACEMENT      x 3 school age       Family History   Problem Relation Age of Onset    High Blood Pressure Father     Schizophrenia Father     OCD Father     Macular Degen Maternal Grandmother     Colon Cancer Maternal Grandfather     Heart Attack Maternal Grandfather     Stroke Paternal Grandmother     Breast Cancer Paternal Grandmother     Heart Attack Paternal Grandfather        Social History     Tobacco Use    Smoking status: Never Smoker    Smokeless tobacco: Never Used   Substance Use Topics    Alcohol use: Yes     Comment: sociably      Current Outpatient Medications   Medication Sig Dispense Refill    amoxicillin (AMOXIL) 875 MG tablet Take 1 tablet by mouth 2 times daily 20 tablet 0     No current facility-administered medications for this visit.       No Known Allergies    Health Maintenance   Topic Date Due    HIV screen  08/26/1994    DTaP/Tdap/Td vaccine (1 - Tdap) 08/26/1998    Lipid screen  04/25/2024    Cervical cancer screen  01/23/2025    Shingles Vaccine (1 of 2) 08/26/2029    Flu vaccine  Completed    Hepatitis A vaccine  Aged Out    Hepatitis B vaccine  Aged Out    Hib vaccine  Aged Out    Meningococcal (ACWY) vaccine  Aged Out    Pneumococcal 0-64 years Vaccine  Aged Out       Subjective:     Review of Systems   Constitutional: Negative for chills, fatigue and fever. Eyes: Negative for visual disturbance. Respiratory: Negative for chest tightness and shortness of breath. Cardiovascular: Negative for chest pain and palpitations. Gastrointestinal: Negative for nausea and vomiting. Genitourinary: Positive for vaginal discharge and vaginal pain. Negative for dyspareunia, dysuria, flank pain, frequency, menstrual problem, pelvic pain and vaginal bleeding. Musculoskeletal: Negative for neck pain and neck stiffness. Skin: Negative for color change and pallor. Neurological: Negative for dizziness and light-headedness. Hematological: Negative for adenopathy. Does not bruise/bleed easily. Psychiatric/Behavioral: Negative for self-injury and suicidal ideas. Objective:     Physical Exam  Vitals signs and nursing note reviewed. Constitutional:       General: She is not in acute distress. Appearance: She is well-developed. She is not diaphoretic. HENT:      Head: Normocephalic and atraumatic. Right Ear: External ear normal.      Left Ear: External ear normal.      Nose: Nose normal.   Eyes:      Pupils: Pupils are equal, round, and reactive to light. Neck:      Musculoskeletal: Normal range of motion and neck supple. Cardiovascular:      Rate and Rhythm: Normal rate and regular rhythm. Heart sounds: Normal heart sounds. No murmur. No friction rub. No gallop. Pulmonary:      Effort: Pulmonary effort is normal.      Breath sounds: Normal breath sounds. No wheezing.    Abdominal:      General: Bowel sounds are normal. Palpations: Abdomen is soft. Abdomen is not rigid. Tenderness: There is no abdominal tenderness. There is no guarding or rebound. Hernia: There is no hernia in the right inguinal area or left inguinal area. Genitourinary:     Labia:         Right: No rash, tenderness, lesion or injury. Left: Lesion present. No rash, tenderness or injury. Vagina: No signs of injury and foreign body. Vaginal discharge (yellow) present. No erythema, tenderness or bleeding. Cervix: No cervical motion tenderness, discharge or friability. Uterus: Not enlarged and not tender. Adnexa:         Right: No mass, tenderness or fullness. Left: No mass, tenderness or fullness. Musculoskeletal: Normal range of motion. Skin:     General: Skin is warm and dry. Findings: No rash. Neurological:      Mental Status: She is alert and oriented to person, place, and time. Cranial Nerves: No cranial nerve deficit. Psychiatric:         Behavior: Behavior normal.         Thought Content: Thought content normal.         Judgment: Judgment normal.       /72 (Site: Left Upper Arm, Position: Sitting, Cuff Size: Large Adult)   Resp 16   Ht 5' 9\" (1.753 m)   Wt 205 lb 6 oz (93.2 kg)   BMI 30.33 kg/m²     Assessment:       Diagnosis Orders   1. Vaginal discharge  VAGINITIS DNA PROBE    Chlamydia Trachomatis & Neisseria gonorrhoeae (GC) by amplified detection   2. Labial lesion  Herpes Simplex 1 & 2, Molecular       Pelvic exam completed cultures obtained and sent    Plan:      Vaginal discharge- check cultures tx if positive. Encouraged probiotic dailyNo CMT tenderness or pelvic tenderness with exam, denies consitutional symptoms. Monitor for fevers, chills, n/v, abdominal/pelvic let us know if occurs. Encouraged  safe sex practices. Labial lesion- check hsv discussed possibly excoriation? Denies known hx hsv in self or partner.      Return if symptoms worsen or fail to improve. Orders Placed This Encounter   Procedures    VAGINITIS DNA PROBE     Standing Status:   Future     Standing Expiration Date:   3/11/2021    Chlamydia Trachomatis & Neisseria gonorrhoeae (GC) by amplified detection     Standing Status:   Future     Standing Expiration Date:   3/11/2021     Order Specific Question:   Source: Answer:   Cervical    Herpes Simplex 1 & 2, Molecular     Type for 1 and 2     Standing Status:   Future     Standing Expiration Date:   3/11/2021         Patient given educational materials - seepatient instructions. Discussed use, benefit, and side effects of prescribed medications. All patient questions answered. Pt voiced understanding. Reviewed health maintenance. Instructed to continue current medications, diet and exercise. Patient agreedwith treatment plan. Follow up as directed. Electronically signed by LENNY Dominguez CNP on 3/11/2020at 1:20 PM      Of the 15 minute duration appointment visit, Xin Lockhart CNP spent at least 50% of the face-to-face time in counseling, explanation of diagnosis, planning of further management, and answering all questions.

## 2020-03-12 LAB
C TRACH DNA GENITAL QL NAA+PROBE: NEGATIVE
HSV 1, NAAT: NEGATIVE
HSV 2, NAAT: NEGATIVE
N. GONORRHOEAE DNA: NEGATIVE
SPECIMEN DESCRIPTION: NORMAL
SPECIMEN DESCRIPTION: NORMAL

## 2020-03-12 RX ORDER — METRONIDAZOLE 500 MG/1
500 TABLET ORAL ONCE
Qty: 4 TABLET | Refills: 0 | Status: SHIPPED | OUTPATIENT
Start: 2020-03-12 | End: 2020-03-23 | Stop reason: SDUPTHER

## 2020-03-12 RX ORDER — METRONIDAZOLE 500 MG/1
500 TABLET ORAL 2 TIMES DAILY
Qty: 14 TABLET | Refills: 0 | Status: CANCELLED | OUTPATIENT
Start: 2020-03-12 | End: 2020-03-19

## 2020-03-12 NOTE — TELEPHONE ENCOUNTER
Please notify her that  she is positive for trichomonas she needs to be treated with flagyl 2gm once, inform may cause nausea, no alcohol when she takes this,  this is an STD she needs to notify her partner/s, and they should be treated she should have ZAK completed at least 2 weeks after treatment completed and within 3 months.      Her GC and HSV was negative

## 2020-03-13 ENCOUNTER — PATIENT MESSAGE (OUTPATIENT)
Dept: OBGYN CLINIC | Age: 40
End: 2020-03-13

## 2020-03-23 ENCOUNTER — PATIENT MESSAGE (OUTPATIENT)
Dept: OBGYN CLINIC | Age: 40
End: 2020-03-23

## 2020-03-23 RX ORDER — METRONIDAZOLE 500 MG/1
500 TABLET ORAL ONCE
Qty: 4 TABLET | Refills: 0 | Status: SHIPPED | OUTPATIENT
Start: 2020-03-23 | End: 2020-03-23

## 2020-03-23 RX ORDER — METRONIDAZOLE 500 MG/1
500 TABLET ORAL ONCE
Qty: 4 TABLET | Refills: 0 | Status: SHIPPED | OUTPATIENT
Start: 2020-03-23 | End: 2020-03-23 | Stop reason: SDUPTHER

## 2020-03-23 NOTE — TELEPHONE ENCOUNTER
From: Jason Cortes  To: LENNY Monaco - CNP  Sent: 3/23/2020 8:26 AM EDT  Subject: Visit Follow-Up Question    There seemed to be an improvement of symptoms several days after taking the flagyl, however I had my period last week so it was harder to tell but I didn't notice the discharge. Now that it has ended this morning I notice a small amount if the greenish/yellowish discharge again. Would this warrant another course of antibiotics? I didn't want to wait too long to ask, and hopefully I haven't already.   Thank you  Jason Cortes

## 2020-04-22 ENCOUNTER — HOSPITAL ENCOUNTER (OUTPATIENT)
Age: 40
Setting detail: SPECIMEN
Discharge: HOME | End: 2020-04-22
Payer: COMMERCIAL

## 2020-04-22 ENCOUNTER — OFFICE VISIT (OUTPATIENT)
Dept: OBGYN CLINIC | Age: 40
End: 2020-04-22
Payer: COMMERCIAL

## 2020-04-22 VITALS — WEIGHT: 205 LBS | BODY MASS INDEX: 30.27 KG/M2 | SYSTOLIC BLOOD PRESSURE: 118 MMHG | DIASTOLIC BLOOD PRESSURE: 62 MMHG

## 2020-04-22 LAB
DIRECT EXAM: NORMAL
Lab: NORMAL
SPECIMEN DESCRIPTION: NORMAL

## 2020-04-22 PROCEDURE — 99213 OFFICE O/P EST LOW 20 MIN: CPT | Performed by: NURSE PRACTITIONER

## 2020-04-22 ASSESSMENT — ENCOUNTER SYMPTOMS
COLOR CHANGE: 0
WHEEZING: 0
VOMITING: 0
NAUSEA: 0
SHORTNESS OF BREATH: 0

## 2020-04-22 NOTE — PATIENT INSTRUCTIONS
on the genitals or mouth. · Having one sex partner (who does not have STIs and does not have sex with anyone else) is a good way to avoid STIs. When should you call for help? Call your doctor now or seek immediate medical care if:    · You have unusual vaginal bleeding.     · You have a fever.     · You have new discharge from the vagina or penis.     · You have pelvic pain.    Watch closely for changes in your health, and be sure to contact your doctor if:    · You do not get better as expected.     · You have any new symptoms or your symptoms get worse. Where can you learn more? Go to https://chpepiceweb.[facility]. org and sign in to your [employer] account. Enter B268 in the Rolith box to learn more about \"Trichomoniasis: Care Instructions. \"     If you do not have an account, please click on the \"Sign Up Now\" link. Current as of: July 7, 2019Content Version: 12.4  © 5680-5345 Healthwise, Incorporated. Care instructions adapted under license by [facility] [address] If you have questions about a medical condition or this instruction, always ask your healthcare professional. [place] [address] any warranty or liability for your use of this information.

## 2020-11-22 ENCOUNTER — OFFICE VISIT (OUTPATIENT)
Dept: PRIMARY CARE CLINIC | Age: 40
End: 2020-11-22
Payer: COMMERCIAL

## 2020-11-22 VITALS
HEIGHT: 70 IN | TEMPERATURE: 98.4 F | SYSTOLIC BLOOD PRESSURE: 130 MMHG | OXYGEN SATURATION: 98 % | DIASTOLIC BLOOD PRESSURE: 88 MMHG | WEIGHT: 190 LBS | BODY MASS INDEX: 27.2 KG/M2 | HEART RATE: 89 BPM

## 2020-11-22 PROCEDURE — 99214 OFFICE O/P EST MOD 30 MIN: CPT | Performed by: FAMILY MEDICINE

## 2020-11-22 RX ORDER — PREDNISONE 20 MG/1
40 TABLET ORAL DAILY
Qty: 10 TABLET | Refills: 0 | Status: SHIPPED | OUTPATIENT
Start: 2020-11-22 | End: 2020-11-27

## 2020-11-22 ASSESSMENT — ENCOUNTER SYMPTOMS
SHORTNESS OF BREATH: 0
COUGH: 0
WHEEZING: 0
VOMITING: 0
NAUSEA: 0
ABDOMINAL PAIN: 0
DIARRHEA: 0
RHINORRHEA: 0
SORE THROAT: 0

## 2020-11-22 ASSESSMENT — PATIENT HEALTH QUESTIONNAIRE - PHQ9
1. LITTLE INTEREST OR PLEASURE IN DOING THINGS: 0
SUM OF ALL RESPONSES TO PHQ QUESTIONS 1-9: 0
2. FEELING DOWN, DEPRESSED OR HOPELESS: 0
SUM OF ALL RESPONSES TO PHQ9 QUESTIONS 1 & 2: 0
SUM OF ALL RESPONSES TO PHQ QUESTIONS 1-9: 0
SUM OF ALL RESPONSES TO PHQ QUESTIONS 1-9: 0

## 2020-11-22 NOTE — PATIENT INSTRUCTIONS
Patient Education        Hives: Care Instructions  Your Care Instructions  Hives are raised, red, itchy patches of skin. They are also called wheals or welts. They usually have red borders and pale centers. Hives range in size from ¼ inch to 3 inches or more across. They may seem to move from place to place on the skin. Several hives may form a large area of raised, red skin. You can get hives after an insect sting, after taking medicine or eating certain foods, or because of infection or stress. Other causes include plants, things you breathe in, makeup, heat, cold, sunlight, and latex. You cannot spread hives to other people. Hives may last a few minutes or a few days, but a single spot may last less than 36 hours. Follow-up care is a key part of your treatment and safety. Be sure to make and go to all appointments, and call your doctor if you are having problems. It's also a good idea to know your test results and keep a list of the medicines you take. How can you care for yourself at home? · Avoid whatever you think may have caused your hives, such as a certain food or medicine. However, you may not know the cause. · Put a cool, wet towel on the area to relieve itching. · Take an over-the-counter antihistamine, such as diphenhydramine (Benadryl), cetirizine (Zyrtec), or loratadine (Claritin), to help stop the hives and calm the itching. Read and follow directions on the label. These medicines can make you feel sleepy. Do not drive while using them. · Stay away from strong soaps, detergents, and chemicals. These can make itching worse. When should you call for help? Call 911 anytime you think you may need emergency care. For example, call if:    · You have symptoms of a severe allergic reaction. These may include:  ? Sudden raised, red areas (hives) all over your body. ? Swelling of the throat, mouth, lips, or tongue. ? Trouble breathing. ? Passing out (losing consciousness).  Or you may feel very lightheaded or suddenly feel weak, confused, or restless. Call your doctor now or seek immediate medical care if:    · You have symptoms of an allergic reaction, such as:  ? A rash or hives (raised, red areas on the skin). ? Itching. ? Swelling. ? Belly pain, nausea, or vomiting.     · You get hives after you start a new medicine.     · Hives have not gone away after 24 hours. Watch closely for changes in your health, and be sure to contact your doctor if:    · You do not get better as expected. Where can you learn more? Go to https://3 day BlindspepicWeb and Rankeb.[employer]. org and sign in to your [employer] account. Enter A385 in the Nistica box to learn more about \"Hives: Care Instructions. \"     If you do not have an account, please click on the \"Sign Up Now\" link. Current as of: June 26, 2019               Content Version: 12.6  © 2465-5940 [employer], Incorporated. Care instructions adapted under license by [facility] [employer] [facility] ([facility]). If you have questions about a medical condition or this instruction, always ask your healthcare professional. [place] [address] any warranty or liability for your use of this information. Continue antihistamine as needed.  If rash appears again my try taking steroid  If symptoms worsen or do not improve please follow-up with PCP or return to clinic

## 2020-11-22 NOTE — PROGRESS NOTES
[address]  [address] [id number]  Dept: [phone]  Dept Fax: [phone]    Simon Chin is a 38 y.o. female who presents today for her medical conditions/complaintsas noted below. Simon Chin is c/o of Rash (itchy,rash/hives,x2 days, started under both arms, took benadryl to help)        HPI:     Rash   This is a new problem. The current episode started in the past 7 days (couple days). The problem has been waxing and waning since onset. The rash is diffuse. The rash is characterized by redness, itchiness and swelling. It is unknown if there was an exposure to a precipitant. Pertinent negatives include no congestion, cough, diarrhea, fever, rhinorrhea, shortness of breath, sore throat or vomiting. Past treatments include antihistamine (benadryl, cold shower). The treatment provided mild relief. There is no history of allergies, asthma or eczema.    Sensitive to bug bites but reaction is more localized  No known sick contacts  Past Medical History:   Diagnosis Date    Fatigue     Hemorrhoid     Past medical history reviewed and pertinent positives/negatives in the HPI    Past Surgical History:   Procedure Laterality Date    COLONOSCOPY  4-1-16    hemorrhoids    EYE SURGERY Bilateral     LISIK    HEMORRHOID SURGERY  10/14/2016    hemorrhoidectomy    LASIK Bilateral     TYMPANOSTOMY TUBE PLACEMENT      x 3 school age       Family History   Problem Relation Age of Onset    High Blood Pressure Father     Schizophrenia Father     OCD Father     Macular Degen Maternal Grandmother     Colon Cancer Maternal Grandfather     Heart Attack Maternal Grandfather     Stroke Paternal Grandmother     Breast Cancer Paternal Grandmother     Heart Attack Paternal Grandfather        Social History     Tobacco Use    Smoking status: Never Smoker    Smokeless tobacco: Never Used   Substance Use Topics    Alcohol use: Yes     Comment: sociably Current Outpatient Medications   Medication Sig Dispense Refill    predniSONE (DELTASONE) 20 MG tablet Take 2 tablets by mouth daily for 5 days 10 tablet 0    amoxicillin (AMOXIL) 875 MG tablet Take 1 tablet by mouth 2 times daily (Patient not taking: Reported on 11/22/2020) 20 tablet 0     No current facility-administered medications for this visit. No Known Allergies    Health Maintenance   Topic Date Due    HIV screen  08/26/1994    DTaP/Tdap/Td vaccine (1 - Tdap) 08/26/1998    Lipid screen  04/25/2024    Cervical cancer screen  01/23/2025    Flu vaccine  Completed    Hepatitis A vaccine  Aged Out    Hepatitis B vaccine  Aged Out    Hib vaccine  Aged Out    Meningococcal (ACWY) vaccine  Aged Out    Pneumococcal 0-64 years Vaccine  Aged Out       :      Review of Systems   Constitutional: Negative for chills and fever. HENT: Negative for congestion, ear pain, rhinorrhea and sore throat. Respiratory: Negative for cough, shortness of breath and wheezing. Gastrointestinal: Negative for abdominal pain, diarrhea, nausea and vomiting. Musculoskeletal: Negative for myalgias. Skin: Positive for rash. Negative for pallor. Hematological: Negative for adenopathy. Objective:     Physical Exam  Vitals signs and nursing note reviewed. Constitutional:       Appearance: Normal appearance. HENT:      Head: Normocephalic and atraumatic. Right Ear: Hearing normal.      Left Ear: Hearing normal.      Mouth/Throat:      Lips: Pink. Eyes:      Extraocular Movements: Extraocular movements intact. Conjunctiva/sclera: Conjunctivae normal.   Cardiovascular:      Pulses: Normal pulses. Pulmonary:      Effort: Pulmonary effort is normal.   Skin:     General: Skin is warm and dry. Capillary Refill: Capillary refill takes less than 2 seconds. Findings: Rash (currently only couple 1cm areas of erythema/swelling on arms.  Patient also had pictures of past couple days with large areas of erythema and swelling diffusely over trunk and limbs) present. Neurological:      Mental Status: She is alert and oriented to person, place, and time. Mental status is at baseline. Psychiatric:         Mood and Affect: Mood normal.         Behavior: Behavior normal.         Thought Content: Thought content normal.         Judgment: Judgment normal.       /88   Pulse 89   Temp 98.4 °F (36.9 °C) (Infrared)   Ht 5' 10\" (1.778 m)   Wt 190 lb (86.2 kg)   SpO2 98%   BMI 27.26 kg/m²     Assessment:       Diagnosis Orders   1. Urticaria         Plan:    Continue antihistamine as needed. If rash appears again my try taking steroid as prescribed. If symptoms worsen or do not improve please follow-up with PCP or return to clinic        No orders of the defined types were placed in this encounter. Orders Placed This Encounter   Medications    predniSONE (DELTASONE) 20 MG tablet     Sig: Take 2 tablets by mouth daily for 5 days     Dispense:  10 tablet     Refill:  0      Patient given educational materials - see patient instructions. Discussed use, benefit, and side effects of prescribed medications. All patient questions answered. Pt voiced understanding. Patient agreed with treatment plan. Follow up as directed.      Electronicallysigned by Tima Cat MD on 11/22/2020 at 12:19 PM

## 2021-01-27 ENCOUNTER — OFFICE VISIT (OUTPATIENT)
Dept: PEDIATRIC NEUROLOGY | Age: 41
End: 2021-01-27
Payer: COMMERCIAL

## 2021-01-27 ENCOUNTER — HOSPITAL ENCOUNTER (OUTPATIENT)
Age: 41
Discharge: HOME | End: 2021-01-27
Payer: COMMERCIAL

## 2021-01-27 VITALS
TEMPERATURE: 97.9 F | BODY MASS INDEX: 27.99 KG/M2 | DIASTOLIC BLOOD PRESSURE: 103 MMHG | SYSTOLIC BLOOD PRESSURE: 147 MMHG | HEIGHT: 69 IN | WEIGHT: 189 LBS | HEART RATE: 84 BPM

## 2021-01-27 DIAGNOSIS — R53.83 FATIGUE, UNSPECIFIED TYPE: Chronic | ICD-10-CM

## 2021-01-27 DIAGNOSIS — M79.10 MYALGIA: Primary | ICD-10-CM

## 2021-01-27 DIAGNOSIS — G43.109 MIGRAINE WITH AURA AND WITHOUT STATUS MIGRAINOSUS, NOT INTRACTABLE: Chronic | ICD-10-CM

## 2021-01-27 PROCEDURE — 99214 OFFICE O/P EST MOD 30 MIN: CPT | Performed by: NURSE PRACTITIONER

## 2021-01-27 RX ORDER — MAGNESIUM OXIDE 400 MG/1
400 TABLET ORAL DAILY
COMMUNITY
End: 2021-05-18

## 2021-01-27 RX ORDER — MULTIVIT-MIN/IRON/FOLIC ACID/K 18-600-40
2000 CAPSULE ORAL DAILY
COMMUNITY
End: 2021-05-18

## 2021-01-27 NOTE — PATIENT INSTRUCTIONS
Plan:   1. Restart Amitriptyline  12.5 mg QHS. 2. Continue CoQ 10 200 mg daily. 3.  I would recommend blood work including CBC, CMP, Vitamin D, Ferritin, Vitamin D, TSH, T4, Urinalysis, abiel, antiphospholipid antibodies, esr levels. 4. Monitor your blood pressure twice daily for the next week. 5. Headache log was recommended to be maintained. 6. Naproxen 250 mg at onset of acute headache is recommended. It can be repeated in 6 hours if needed. 7. Migraine abortive medication is also recommended. Imitrex 25 mg tablets, take one tablet at onset of migraine. It is ok to use another dose after two hours if the migraine continues to persist.    8. I would recommend follow up in 1 months.

## 2021-01-27 NOTE — PROGRESS NOTES
states that she has not had a migraine since the last visit. The date of her last migraine is unknown. Ramy Pfeiffer states that she will take Imitrex when she notices vision changes which will typically provide relief. During these episode she will see a spot in her right eye. If she takes Imitrex as soon as these changes occur, she is able to abort the migraine. She denies any nausea or vomiting. Migraine description provided below:      MIGRAINE DESCRIPTION:  She describe the pain to involve the bifrontal and temporal regions at an intensity of 8/10. Prior to the headache, she has had visual aura consisting of seeing flashing lights. Nausea or vomiting does not accompany the migraines. Ramy Pfeiffer reports that initially she sees a small black spot, and this gradually gets larger with more vision loss, more so on the left side. She states that sometimes she can stop the progression of vision loss if she takes Aleve right away. She will prefer to go and sleep in a dark, quiet room and rest. She has needed to take 10-20 minute breaks from work at times. There is no associated numbness, tingling or weakness with these headaches. Rest gives partial relief of these headaches. Previous Medications Tried: Topamax (ineffective, irregular menses)     REVIEW OF SYSTEMS:  Constitutional: Negative. Eyes: Negative. Respiratory: Negative. Cardiovascular: Negative. Gastrointestinal: Negative. Genitourinary: Negative. Musculoskeletal: Neck stiffness    Skin: Negative. Neurological: positive for headaches and migraines, negative for seizures, negative for developmental delays, positive for fatigue and dizziness. Hematological: Negative. Psychiatric/Behavioral: negative for behavioral issues, positive for ADD. All other systems reviewed and are negative. Past, social, family, and developmental history was reviewed and unchanged.      Objective:   PHYSICAL EXAM:   BP (!) 147/103 (Site: Right Upper Arm, Position: Sitting, Cuff Size: Large Adult)   Pulse 84   Temp 97.9 °F (36.6 °C)   Ht 5' 9.17\" (1.757 m)   Wt 189 lb (85.7 kg)   BMI 27.77 kg/m²    BP Rechecked 143/95     Neurological: she is alert and has normal strength and normal reflexes. she displays no atrophy, no tremor and normal reflexes. No cranial nerve deficit or sensory deficit. she exhibits normal muscle tone. she can stand and walk. she displays no seizure activity. Reflex Scores: 2+ diffuse. No focal weakness noted on exam.     Nursing note and vitals reviewed. Constitutional: she appears well-developed and well-nourished. HENT: Mouth/Throat: Mucous membranes are moist.   Eyes: EOM are normal. Pupils are equal, round, and reactive to light. Neck: Normal range of motion. Neck supple. Cardiovascular: Regular rhythm, S1 normal and S2 normal.   Pulmonary/Chest: Effort normal and breath sounds normal.   Lymph Nodes: No significant lymphadenopathy noted. Musculoskeletal: Normal range of motion. Neurological: she is alert and rest of the exam is as mentioned above. Skin: Skin is warm and dry. No lesions or ulcers. RECORD REVIEW: Previous medical records were reviewed at today's visit. DIAGNOSTIC STUDIES:  8/21/14 - MRI Brain - Normal    Ref.  Range 8/8/2014 08:21   Sodium Latest Range: 133-142 mmol/L 134   Potassium Latest Range: 3.5-5.1 mmol/L 4.2   Chloride Latest Range:  mmol/L 98   CO2 Latest Range: 20-31 mmol/L 22   Anion Gap Latest Range: 8-16 mmol/L 18 (H)   Glucose Latest Range: 70-99 mg/dL 93   BUN Latest Range: 6-20 mg/dL 9   Creatinine Latest Range: 0.50-0.90 mg/dL 0.67   Alk Phos Latest Range:  U/L 75   Calcium Latest Range: 8.6-10.0 mg/dL 9.5   Total Protein Latest Range: 6.4-8.3 g/dL 7.7   Albumin Latest Range: 3.5-5.2 g/dL 4.1   ALT Latest Range: 5-33 U/L 18   AST Latest Range: <32 U/L 21   Bilirubin, Direct Latest Range: <0.31 mg/dL 0.10   Bilirubin, Indirect Latest Range: 0.00-1.00 mg/dL 0.30   Bilirubin Latest Range: 0.3-1.2 mg/dL 0.40   Albumin/Globulin Ratio Latest Range: 1.7-2.5  1.1 (L)   Chol/HDL Ratio Latest Range: <5  4.1   HDL Cholesterol Latest Range: >40 mg/dL 52   LDL Cholesterol Latest Range: 0-130 mg/dL 137 (H)   Triglycerides Latest Range: <150 mg/dL 123   Cholesterol Latest Range: <200 mg/dL 214 (H)   Ferritin Latest Range:  ug/L 56   Vit D, 1,25-Dihydroxy Latest Range: 15-75 pg/mL 119 (H)   T3, Free Latest Range: 2.02-4.43 pg/mL 3.97   Thyroxine, Free Latest Range: 0.93-1.70 ng/dL 1.05   TSH Latest Range: 0.30-5.00 mIU/L 2.17   WBC Latest Range: 3.5-11.0 k/uL 7.9   RBC Latest Range: 4.0-5.2 m/uL 4.59   Hemoglobin Quant Latest Range: 12.0-16.0 g/dL 13.1   Hematocrit Latest Range: 36-46 % 38.5   RDW Latest Range: 12.5-15.4 % 13.8   Platelet Count Latest Range: 140-450 k/uL 277   Sed Rate Latest Range: 0-20 mm 18   [facility] Latest Range: NEG  POSITIVE (A)   Anti ds DNA Latest Range: <100 IU/mL 141 (H)   Histone AB Latest Range: <100 U/mL 45   Anti RM-1 Latest Range: <100 U/mL 19   Anti-Smith Latest Range: <100 U/mL 22   Rheumatoid Factor Latest Range: <14  <10.0   Anti SSA Latest Range: <100 U/mL 33   Anti SSB Latest Range: <100 U/mL 18   Anti-Scleroderma Latest Range: <100 U/mL 71   Anti RNP Latest Range: <100 U/mL 35   [facility] Reference Range: No range found Pend   Anti-Centromere Latest Range: <100 U/mL 9   IGA Latest Range:  mg/dL 148   Total IgG Latest Range: 700-1600 mg/dL 1439   IgM Latest Range:  mg/dL 203      Assessment:   Misti Tim is a 38 y.o. female with:   1. Fatigue, Myalgia and recent history of rash. She has a positive [facility] test in the past and seen rheumatology with no clear cut diagnosis. I would like to repeat labs in this regard. Differential includes Lupus. 2. Chronic daily tension related headaches which have shown improvement on Elavil. 3. Migraines with aura (associated with partial vision loss).   The Imitrex helps but it takes around 20 minutes for this migraine to get aborted. 4. Concentration Deficit issues on some occasions. 5. Elevated blood pressure. Will continue to monitor. Plan:   1. Restart Amitriptyline  12.5 mg QHS. 2. Continue CoQ 10 200 mg daily. 3.  I would recommend blood work including CBC, CMP, Vitamin D, Ferritin, Vitamin D, TSH, T4, Urinalysis,  Leelee levels. 4. Monitor your blood pressure twice daily for the next week. 5. Headache log was recommended to be maintained. 6. Naproxen 250 mg at onset of acute headache is recommended. It can be repeated in 6 hours if needed. 7. Migraine abortive medication is also recommended. Imitrex 25 mg tablets, take one tablet at onset of migraine. It is ok to use another dose after two hours if the migraine continues to persist.    8. I would recommend follow up in 1 months.     Electronically signed by LENNY Melton CNP on 1/27/2021 at 11:27 AM

## 2021-01-27 NOTE — LETTER
[facility]   [address]  [place], [address] [place] [address] Street  Phone: [phone]  [phone]      1/31/2021      Julius Cunningham, [address] [address] [place]  [id number]    Patient: Pavel Cornelius  YOB: 1980  Date of Visit: 1/27/2021   MRN:  [id number]      Dear Dr. Evon Connor,      It was a pleasure to see Pavel Cornelius at the Pediatric Neurology Clinic at [facility]. She is a 38 y.o. female  to this visit for a follow up neurological evaluation. INTERIM PROGRESS:  FATIGUE:  Viktoria Anaya states that the fatigue continues to persist and is a concern. Viktoria Anaya states that the fatigue has been worse over the past month. She states that she has had a lot of muscle pain in her upper back, shoulders, neck and chest wall. She continues to use massage therapy in this regard. She states that it has not been effective. She feels that the muscle discomfort is much worse than it has been in the past.  Viktoria Anaya states that she has been under more stress recently. She is eating regularly. States approximately 1 month ago she had a rash all over her body. She was treated in the urgent care for hives. She states since the rash developed she has been more tired and fatigued. Viktoria Anaya has had a positive K[facility]ISER titer in the past and has seen rheumatology with no clear cut diagnosis. Viktoria Anaya weaned herself from Elavil a few months ago. HEADACHES:   Viktoria Anaya headaches have shown them. She states that she is intimately 1 time a month or less. This is an improvement from the past when she would have headaches related given states that she will can at the onset of a headache which will typically provide relief within a few hour.  Headache description below:        HEADACHE DESCRIPTION: These headaches are of a low-moderate intensity, pressure type, occurring in the bifrontal, temporal regions as well as neck and back pain. She is able to do her daily activities and this does result in interruption of work or other activities at home. There is no associated light or sound sensitivity or neurological deficits with this headache. This type of headache is reported to be constant, with dull tension type of pain. MIGRAINES ASSOCIATED WITH VISION LOSS:  Mirian Ho continues to have intermittent migraines. She states that she has not had a migraine since the last visit. The date of her last migraine is unknown. Mirian Ho states that she will take Imitrex when she notices vision changes which will typically provide relief. During these episode she will see a spot in her right eye. If she takes Imitrex as soon as these changes occur, she is able to abort the migraine. She denies any nausea or vomiting. Migraine description provided below:      MIGRAINE DESCRIPTION:  She describe the pain to involve the bifrontal and temporal regions at an intensity of 8/10. Prior to the headache, she has had visual aura consisting of seeing flashing lights. Nausea or vomiting does not accompany the migraines. Mirian Ho reports that initially she sees a small black spot, and this gradually gets larger with more vision loss, more so on the left side. She states that sometimes she can stop the progression of vision loss if she takes Aleve right away. She will prefer to go and sleep in a dark, quiet room and rest. She has needed to take 10-20 minute breaks from work at times. There is no associated numbness, tingling or weakness with these headaches. Rest gives partial relief of these headaches. Previous Medications Tried: Topamax (ineffective, irregular menses)     REVIEW OF SYSTEMS:  Constitutional: Negative. Eyes: Negative. Respiratory: Negative. Cardiovascular: Negative. Gastrointestinal: Negative. Genitourinary: Negative. Musculoskeletal: Neck stiffness    Skin: Negative. Neurological: positive for headaches and migraines, negative for seizures, negative for developmental delays, positive for fatigue and dizziness. Hematological: Negative. Psychiatric/Behavioral: negative for behavioral issues, positive for ADD. All other systems reviewed and are negative. Past, social, family, and developmental history was reviewed and unchanged. Objective:   PHYSICAL EXAM:   BP (!) 147/103 (Site: Right Upper Arm, Position: Sitting, Cuff Size: Large Adult)   Pulse 84   Temp 97.9 °F (36.6 °C)   Ht 5' 9.17\" (1.757 m)   Wt 189 lb (85.7 kg)   BMI 27.77 kg/m²    BP Rechecked 143/95     Neurological: she is alert and has normal strength and normal reflexes. she displays no atrophy, no tremor and normal reflexes. No cranial nerve deficit or sensory deficit. she exhibits normal muscle tone. she can stand and walk. she displays no seizure activity. Reflex Scores: 2+ diffuse. No focal weakness noted on exam.     Nursing note and vitals reviewed. Constitutional: she appears well-developed and well-nourished. HENT: Mouth/Throat: Mucous membranes are moist.   Eyes: EOM are normal. Pupils are equal, round, and reactive to light. Neck: Normal range of motion. Neck supple. Cardiovascular: Regular rhythm, S1 normal and S2 normal.   Pulmonary/Chest: Effort normal and breath sounds normal.   Lymph Nodes: No significant lymphadenopathy noted. Musculoskeletal: Normal range of motion. Neurological: she is alert and rest of the exam is as mentioned above. Skin: Skin is warm and dry. No lesions or ulcers. RECORD REVIEW: Previous medical records were reviewed at today's visit. DIAGNOSTIC STUDIES:  8/21/14 - MRI Brain - Normal    Ref.  Range 8/8/2014 08:21   Sodium Latest Range: 133-142 mmol/L 134   Potassium Latest Range: 3.5-5.1 mmol/L 4.2   Chloride Latest Range:  mmol/L 98 CO2 Latest Range: 20-31 mmol/L 22   Anion Gap Latest Range: 8-16 mmol/L 18 (H)   Glucose Latest Range: 70-99 mg/dL 93   BUN Latest Range: 6-20 mg/dL 9   Creatinine Latest Range: 0.50-0.90 mg/dL 0.67   Alk Phos Latest Range:  U/L 75   Calcium Latest Range: 8.6-10.0 mg/dL 9.5   Total Protein Latest Range: 6.4-8.3 g/dL 7.7   Albumin Latest Range: 3.5-5.2 g/dL 4.1   ALT Latest Range: 5-33 U/L 18   AST Latest Range: <32 U/L 21   Bilirubin, Direct Latest Range: <0.31 mg/dL 0.10   Bilirubin, Indirect Latest Range: 0.00-1.00 mg/dL 0.30   Bilirubin Latest Range: 0.3-1.2 mg/dL 0.40   Albumin/Globulin Ratio Latest Range: 1.7-2.5  1.1 (L)   Chol/HDL Ratio Latest Range: <5  4.1   HDL Cholesterol Latest Range: >40 mg/dL 52   LDL Cholesterol Latest Range: 0-130 mg/dL 137 (H)   Triglycerides Latest Range: <150 mg/dL 123   Cholesterol Latest Range: <200 mg/dL 214 (H)   Ferritin Latest Range:  ug/L 56   Vit D, 1,25-Dihydroxy Latest Range: 15-75 pg/mL 119 (H)   T3, Free Latest Range: 2.02-4.43 pg/mL 3.97   Thyroxine, Free Latest Range: 0.93-1.70 ng/dL 1.05   TSH Latest Range: 0.30-5.00 mIU/L 2.17   WBC Latest Range: 3.5-11.0 k/uL 7.9   RBC Latest Range: 4.0-5.2 m/uL 4.59   Hemoglobin Quant Latest Range: 12.0-16.0 g/dL 13.1   Hematocrit Latest Range: 36-46 % 38.5   RDW Latest Range: 12.5-15.4 % 13.8   Platelet Count Latest Range: 140-450 k/uL 277   Sed Rate Latest Range: 0-20 mm 18   [facility] Latest Range: NEG  POSITIVE (A)   Anti ds DNA Latest Range: <100 IU/mL 141 (H)   Histone AB Latest Range: <100 U/mL 45   Anti RM-1 Latest Range: <100 U/mL 19   Anti-Smith Latest Range: <100 U/mL 22   Rheumatoid Factor Latest Range: <14  <10.0   Anti SSA Latest Range: <100 U/mL 33   Anti SSB Latest Range: <100 U/mL 18   Anti-Scleroderma Latest Range: <100 U/mL 71   Anti RNP Latest Range: <100 U/mL 35   [facility] Reference Range: No range found Pend   Anti-Centromere Latest Range: <100 U/mL 9   IGA Latest Range:  mg/dL 148 Total IgG Latest Range: 700-1600 mg/dL 1439   IgM Latest Range:  mg/dL 203      Assessment:   Mercedes Chaney is a 38 y.o. female with:   1. Fatigue, Myalgia and recent history of rash. She has a positive LEELEE test in the past and seen rheumatology with no clear cut diagnosis. I would like to repeat labs in this regard. Differential includes Lupus. 2. Chronic daily tension related headaches which have shown improvement on Elavil. 3. Migraines with aura (associated with partial vision loss). The Imitrex helps but it takes around 20 minutes for this migraine to get aborted. 4. Concentration Deficit issues on some occasions. 5. Elevated blood pressure. Will continue to monitor. Plan:   1. Restart Amitriptyline  12.5 mg QHS. 2. Continue CoQ 10 200 mg daily. 3.  I would recommend blood work including CBC, CMP, Vitamin D, Ferritin, Vitamin D, TSH, T4, Urinalysis,  Leelee levels. 4. Monitor your blood pressure twice daily for the next week. 5. Headache log was recommended to be maintained. 6. Naproxen 250 mg at onset of acute headache is recommended. It can be repeated in 6 hours if needed. 7. Migraine abortive medication is also recommended. Imitrex 25 mg tablets, take one tablet at onset of migraine. It is ok to use another dose after two hours if the migraine continues to persist.    8. I would recommend follow up in 1 months. Electronically signed by LENNY Robertson CNP on 1/27/2021 at 11:27 AM            If you have any questions or concerns, please feel free to call me. Thank you again for referring this patient to be seen in our clinic.     Sincerely,        Tova Mcneill CNP

## 2021-03-19 DIAGNOSIS — R53.83 FATIGUE, UNSPECIFIED TYPE: Primary | Chronic | ICD-10-CM

## 2021-03-26 ENCOUNTER — HOSPITAL ENCOUNTER (OUTPATIENT)
Age: 41
Discharge: HOME | End: 2021-03-26
Payer: COMMERCIAL

## 2021-03-26 DIAGNOSIS — R53.83 FATIGUE, UNSPECIFIED TYPE: ICD-10-CM

## 2021-03-26 DIAGNOSIS — R53.83 FATIGUE, UNSPECIFIED TYPE: Primary | ICD-10-CM

## 2021-03-26 PROCEDURE — 36415 COLL VENOUS BLD VENIPUNCTURE: CPT

## 2021-03-26 PROCEDURE — 86147 CARDIOLIPIN ANTIBODY EA IG: CPT

## 2021-03-29 LAB
ANTICARDIOLIPIN IGA ANTIBODY: 3.6 APL (ref 0–14)
ANTICARDIOLIPIN IGG ANTIBODY: 3.1 GPL (ref 0–10)
CARDIOLIPIN AB IGM: 29 MPL (ref 0–10)

## 2021-04-01 NOTE — RESULT ENCOUNTER NOTE
I discussed results with patient and recommended further workup for lupus or autoimmune disorder with rheumatologist.

## 2021-04-26 DIAGNOSIS — R53.83 FATIGUE, UNSPECIFIED TYPE: Primary | ICD-10-CM

## 2021-04-26 DIAGNOSIS — M79.10 MYALGIA: ICD-10-CM

## 2021-04-26 DIAGNOSIS — G43.109 MIGRAINE WITH AURA AND WITHOUT STATUS MIGRAINOSUS, NOT INTRACTABLE: ICD-10-CM

## 2021-05-18 ENCOUNTER — HOSPITAL ENCOUNTER (OUTPATIENT)
Age: 41
Setting detail: SPECIMEN
Discharge: HOME | End: 2021-05-18
Payer: COMMERCIAL

## 2021-05-18 ENCOUNTER — OFFICE VISIT (OUTPATIENT)
Dept: OBGYN CLINIC | Age: 41
End: 2021-05-18
Payer: COMMERCIAL

## 2021-05-18 VITALS — BODY MASS INDEX: 29.39 KG/M2 | DIASTOLIC BLOOD PRESSURE: 62 MMHG | WEIGHT: 200 LBS | SYSTOLIC BLOOD PRESSURE: 118 MMHG

## 2021-05-18 DIAGNOSIS — Z12.31 VISIT FOR SCREENING MAMMOGRAM: ICD-10-CM

## 2021-05-18 DIAGNOSIS — R53.83 FATIGUE, UNSPECIFIED TYPE: ICD-10-CM

## 2021-05-18 DIAGNOSIS — N92.0 MENORRHAGIA WITH REGULAR CYCLE: ICD-10-CM

## 2021-05-18 DIAGNOSIS — Z01.419 VISIT FOR GYNECOLOGIC EXAMINATION: Primary | ICD-10-CM

## 2021-05-18 DIAGNOSIS — M79.10 MYALGIA: ICD-10-CM

## 2021-05-18 DIAGNOSIS — G43.109 MIGRAINE WITH AURA AND WITHOUT STATUS MIGRAINOSUS, NOT INTRACTABLE: ICD-10-CM

## 2021-05-18 PROCEDURE — 99396 PREV VISIT EST AGE 40-64: CPT | Performed by: OBSTETRICS & GYNECOLOGY

## 2021-05-18 ASSESSMENT — ENCOUNTER SYMPTOMS
RHINORRHEA: 0
SHORTNESS OF BREATH: 0
VOMITING: 0
BACK PAIN: 0
ABDOMINAL PAIN: 0
COUGH: 0
COLOR CHANGE: 0
NAUSEA: 0
CONSTIPATION: 0
DIARRHEA: 0

## 2021-05-18 NOTE — PROGRESS NOTES
Maco Don is a 38 y.o.  here for her annual exam.  The patient was seen and examined. The patients past medical, surgical, social and family history were reviewed. Current medications and allergies were reviewed, and documented in the chart. She is employed through [employer] [place] [facility] [place] She is inquiring on [facility] referral to discuss sperm donor and possible IVF. Discussed ovarian reserve and discussed risk of AMA and possible need for genetic preimplantation testing. Referral provided    Exercise Yes  Diet Yes  Tobacco abuse No    Last PAP:1/2020 -negative, hx of abnormal PAP yes - ASCUS +hpv  Family hx uterine or ovarian cancer-denies  Last mammogram- 12/20/18-negative Family hx of breast cancer -[facility]M   family hx colon cancer -MGF        Sexually active: yes, multiple partners: No, Dyspareunia: No, Vaginal discharge: no, positive itching though past few days, denies odor  UTI symptoms: no, voiding difficulties: no, bowels regular:Yes bloating:no      Menstrual history:  Menarche age- 14 ,   LMP- 1/2/20  Birth control:   She stopped her oral contraceptive in Fall 2019, she states that she has had periods every 28 days since and lasting 7 days that are very heavy with clots, thought related to periods just regulating after stopping bcp. She is also somewhat concerned about her age and conceiving. she is considering talking to fertility specialist she will let s us know what she decides discussed AMA risks    denies hx of blood clot or clotting disorder. Denies chest pain or pressure, SOB, calf pain or swelling, headaches, or vision changes. OB History   No obstetric history on file.        Vitals:    05/18/21 1358   BP: 118/62   Site: Right Upper Arm   Position: Sitting   Cuff Size: Medium Adult   Weight: 200 lb (90.7 kg)       Wt Readings from Last 3 Encounters:   05/18/21 200 lb (90.7 kg)   01/27/21 189 lb (85.7 kg)   11/22/20 190 lb (86.2 kg)     Past Medical History:   Diagnosis Date    Fatigue     Hemorrhoid                                                                    Past Surgical History:   Procedure Laterality Date    COLONOSCOPY  4-1-16    hemorrhoids    EYE SURGERY Bilateral     LISIK    HEMORRHOID SURGERY  10/14/2016    hemorrhoidectomy    LASIK Bilateral     TYMPANOSTOMY TUBE PLACEMENT      x 3 school age     Family History   Problem Relation Age of Onset    High Blood Pressure Father     Schizophrenia Father     OCD Father     Macular Degen Maternal Grandmother     Colon Cancer Maternal Grandfather     Heart Attack Maternal Grandfather     Stroke Paternal Grandmother     Breast Cancer Paternal Grandmother     Heart Attack Paternal Grandfather      Social History     Tobacco Use   Smoking Status Never Smoker   Smokeless Tobacco Never Used     Social History     Substance and Sexual Activity   Alcohol Use Yes    Comment: sociably        Social History     Tobacco History     Smoking Status  Never Smoker    Smokeless Tobacco Use  Never Used          Alcohol History     Alcohol Use Status  Yes Comment  sociably          Drug Use     Drug Use Status  No          Sexual Activity     Sexually Active  Not Asked              No Known Allergies  No current outpatient medications on file. No current facility-administered medications for this visit. Subjective:     Review of Systems   Constitutional: Negative for chills, fatigue, fever and unexpected weight change. HENT: Negative for congestion and rhinorrhea. Eyes: Negative for visual disturbance. Respiratory: Negative for cough and shortness of breath. Cardiovascular: Negative for chest pain, palpitations and leg swelling. Gastrointestinal: Negative for abdominal pain, constipation, diarrhea, nausea and vomiting. Endocrine: Negative for cold intolerance, heat intolerance, polydipsia and polyuria. Genitourinary: Positive for menstrual problem.  Negative for dyspareunia, dysuria, flank pain, pelvic pain, cranial nerve deficit. Deep Tendon Reflexes: Reflexes are normal and symmetric. Psychiatric:         Behavior: Behavior normal.         Thought Content: Thought content normal.         Judgment: Judgment normal.       /62 (Site: Right Upper Arm, Position: Sitting, Cuff Size: Medium Adult)   Wt 200 lb (90.7 kg)   LMP 04/22/2021 (Approximate)   Breastfeeding No   BMI 29.39 kg/m²     Assessment:       Diagnosis Orders   1. Visit for gynecologic examination  PAP SMEAR   2. Visit for screening mammogram  ANDREW DIGITAL SCREEN W OR WO CAD BILATERAL   3. Menorrhagia with regular cycle  Hemoglobin A1C    TSH With Reflex Ft4    CBC Auto Differential    Comprehensive Metabolic Panel    Anti Mullerian Hormone    Luteinizing Hormone    Follicle Stimulating Hormone    Estradiol    Progesterone       Breast exam completed. Pelvic exam pap smear collected and sent. Cultures sent Yes    Plan:   Collect pap   BSE reviewed, Mammogram ordered  STD prevention reviewed    Will order labs and pelvic US  [facility] referral given  DVT signs reviewed with patient. Refill medication if appropriate  Diet & Exercise reviewed with pt. Preventive  Health through PCP   RV prn/annual           Orders Placed This Encounter   Procedures    ANDREW DIGITAL SCREEN W OR WO CAD BILATERAL     Standing Status:   Future     Standing Expiration Date:   7/18/2022     Order Specific Question:   Reason for exam:     Answer:   SCREENING    PAP SMEAR     Patient History:    Patient's last menstrual period was 04/22/2021 (approximate).   OBGYN Status: Having periods  Past Surgical History:  4-1-16: COLONOSCOPY      Comment:  hemorrhoids  No date: EYE SURGERY; Bilateral      Comment:  RAHUL  10/14/2016: HEMORRHOID SURGERY      Comment:  hemorrhoidectomy  No date: LASIK; Bilateral  No date: TYMPANOSTOMY TUBE PLACEMENT      Comment:  x 3 school age      Social History    Tobacco Use      Smoking status: Never Smoker      Smokeless tobacco: Never Used Standing Status:   Future     Standing Expiration Date:   5/19/2022     Order Specific Question:   Collection Type     Answer: Thin Prep     Order Specific Question:   Prior Abnormal Pap Test     Answer:   No     Order Specific Question:   Screening or Diagnostic     Answer:   Screening     Order Specific Question:   HPV Requested? Answer:   Yes     Order Specific Question:   High Risk Patient     Answer:   N/A    Hemoglobin A1C     Standing Status:   Future     Standing Expiration Date:   5/18/2022    TSH With Reflex Ft4     Standing Status:   Future     Standing Expiration Date:   5/18/2022    CBC Auto Differential     Standing Status:   Future     Standing Expiration Date:   5/18/2022    Comprehensive Metabolic Panel     Standing Status:   Future     Standing Expiration Date:   5/18/2022    Anti Mullerian Hormone     Standing Status:   Future     Standing Expiration Date:   6/18/2021    Luteinizing Hormone     Standing Status:   Future     Standing Expiration Date:   8/88/7133    Follicle Stimulating Hormone     Standing Status:   Future     Standing Expiration Date:   5/18/2022    Estradiol     Standing Status:   Future     Standing Expiration Date:   5/18/2022    Progesterone     Standing Status:   Future     Standing Expiration Date:   5/18/2022     No orders of the defined types were placed in this encounter. Patient given educational materials - seepatient instructions. Discussed use, benefit, and side effects of prescribed medications. All patient questions answered. Pt voiced understanding. Reviewed health maintenance. Instructed to continue current medications, diet and exercise. Patient agreedwith treatment plan. Follow up as directed.       Electronically signed by Varinder Quinonez DO on 5/18/2021at 4:14 PM

## 2021-05-19 LAB — ANTI-NUCLEAR ANTIBODY (ANA): NEGATIVE

## 2021-05-20 LAB
CYTOLOGY REPORT: NORMAL
HPV SAMPLE: NORMAL
HPV, GENOTYPE 16: NOT DETECTED
HPV, GENOTYPE 18: NOT DETECTED
HPV, HIGH RISK OTHER: NOT DETECTED
HPV, INTERPRETATION: NORMAL
SPECIMEN DESCRIPTION: NORMAL

## 2021-06-02 ENCOUNTER — TELEPHONE (OUTPATIENT)
Dept: OBGYN CLINIC | Age: 41
End: 2021-06-02

## 2021-06-02 NOTE — TELEPHONE ENCOUNTER
----- Message from Kala Perez DO sent at 5/30/2021 11:50 PM EDT -----  Fibroid uterus with possible intrauterine fibroid. Recommend Hscope, D&C, Myosure. Please call and notify patient of ultrasound results. Given her heavy periods recommend endometrial sampling and given her desire for possible IVF would be best to remove that intrauterine area. Can schedule virtual if patient would like to discuss this recommendation. Thank you.

## 2021-06-14 ENCOUNTER — TELEMEDICINE (OUTPATIENT)
Dept: OBGYN CLINIC | Age: 41
End: 2021-06-14
Payer: COMMERCIAL

## 2021-06-14 DIAGNOSIS — N92.0 MENORRHAGIA WITH REGULAR CYCLE: Primary | ICD-10-CM

## 2021-06-14 DIAGNOSIS — R93.89 ABNORMAL ULTRASOUND: ICD-10-CM

## 2021-06-14 DIAGNOSIS — D25.1 INTRAMURAL AND SUBMUCOUS LEIOMYOMA OF UTERUS: ICD-10-CM

## 2021-06-14 DIAGNOSIS — D25.0 INTRAMURAL AND SUBMUCOUS LEIOMYOMA OF UTERUS: ICD-10-CM

## 2021-06-14 PROCEDURE — 99213 OFFICE O/P EST LOW 20 MIN: CPT | Performed by: OBSTETRICS & GYNECOLOGY

## 2021-06-14 NOTE — PROGRESS NOTES
Hiren Faulkner  2021    Hiren Faulkner (:  1980) has requested an audio/video evaluation for the following concern(s):    1. Menorrhagia with regular cycle    2. Abnormal ultrasound    3. Intramural and submucous leiomyoma of uterus          TELEHEALTH EVALUATION -- Audio/Visual (During [facility] public health emergency)      Hiren Faulkner is a 38 y.o. female No obstetric history on file. She was here to follow-up regarding her labs and diagnostics ordered at her last visit for the diagnosis of:    ICD-10-CM    1. Menorrhagia with regular cycle  N92.0    2. Abnormal ultrasound  R93.89    3. Intramural and submucous leiomyoma of uterus  D25.1     D25.0        She does not have any specific chief complaint today. Her bowels are regular and she is voiding without difficulty. She has been having irregular bleeding. Ultrasound showed multiple fibroids with markedly increased endometrial stripe with possible submucosal fibroid. Past Medical History:   Diagnosis Date    Fatigue     Hemorrhoid          Past Surgical History:   Procedure Laterality Date    COLONOSCOPY  16    hemorrhoids    EYE SURGERY Bilateral     LISIK    HEMORRHOID SURGERY  10/14/2016    hemorrhoidectomy    LASIK Bilateral     TYMPANOSTOMY TUBE PLACEMENT      x 3 school age         Family History   Problem Relation Age of Onset    High Blood Pressure Father     Schizophrenia Father     OCD Father     Macular Degen Maternal Grandmother     Colon Cancer Maternal Grandfather     Heart Attack Maternal Grandfather     Stroke Paternal Grandmother     Breast Cancer Paternal Grandmother     Heart Attack Paternal Grandfather          Social History     Tobacco Use    Smoking status: Never Smoker    Smokeless tobacco: Never Used   Vaping Use    Vaping Use: Never used   Substance Use Topics    Alcohol use: Yes     Comment: sociably    Drug use: No         MEDICATIONS:  No current outpatient medications on file. complaint on file. as well as  counseling on preventative health maintenance follow-up.

## 2021-06-23 ENCOUNTER — PREP FOR PROCEDURE (OUTPATIENT)
Dept: OBGYN CLINIC | Age: 41
End: 2021-06-23

## 2021-06-23 DIAGNOSIS — Z01.818 PRE-OP TESTING: Primary | ICD-10-CM

## 2021-07-26 ENCOUNTER — OFFICE VISIT (OUTPATIENT)
Dept: PEDIATRIC NEUROLOGY | Age: 41
End: 2021-07-26
Payer: COMMERCIAL

## 2021-07-26 VITALS
OXYGEN SATURATION: 100 % | HEART RATE: 82 BPM | SYSTOLIC BLOOD PRESSURE: 142 MMHG | DIASTOLIC BLOOD PRESSURE: 104 MMHG | TEMPERATURE: 97.9 F | RESPIRATION RATE: 18 BRPM

## 2021-07-26 DIAGNOSIS — G43.109 MIGRAINE WITH AURA AND WITHOUT STATUS MIGRAINOSUS, NOT INTRACTABLE: ICD-10-CM

## 2021-07-26 DIAGNOSIS — F41.9 ANXIETY: ICD-10-CM

## 2021-07-26 DIAGNOSIS — R53.83 FATIGUE, UNSPECIFIED TYPE: Primary | ICD-10-CM

## 2021-07-26 PROCEDURE — 99214 OFFICE O/P EST MOD 30 MIN: CPT | Performed by: NURSE PRACTITIONER

## 2021-07-26 RX ORDER — SERTRALINE HYDROCHLORIDE 25 MG/1
TABLET, FILM COATED ORAL
Qty: 30 TABLET | Refills: 3 | Status: SHIPPED | OUTPATIENT
Start: 2021-07-26 | End: 2021-08-25 | Stop reason: SDUPTHER

## 2021-07-26 NOTE — LETTER
[id number] [facility]   [address]  [facility], [address] [facility]  Phone: [phone]  [phone]      7/28/2021      Renetta Garrett, [address] [address]  [place] [address]    Patient: Cass Gallego  YOB: 1980  Date of Visit: 7/26/2021   MRN:  [id number]      Dear Dr. Adelina Good,      It was a pleasure to see Cass Gallego at the Pediatric Neurology Clinic at [facility]. She is a 38 y.o. female  to this visit for a follow up neurological evaluation. INTERIM PROGRESS:  FATIGUE/ANXIETY:  Zee Tracy state that the fatigue and anxiety continues to persist and has been worse over the past few months. Zee Tracy states that she has been dealing with ending a long term relationship. She recently also had testing to determine of her Ovarian count and was told that she will not be able to have children without an egg donor. Navin Deb that this has been causing her sleep difficulties, stress and anxiety as she would like to become a mother. She has been having muscle pain in her upper back, shoulders and neck area and feels it is related to stress. Her blood pressure has also been elevated and Zee Tracy feels this is due to stress. Zee Tracy weaned herself from the Elavil. She has been feeling sad for the last several months and feels that she would benefit from starting medication for her anxiety, depression and fatigue. No concerns for suicidal, homicidal or self injurious behaviors    HEADACHES:   Zee Tracy states that she continues to have headaches. The headaches occur 1-2 times a month or less. She reports that the headaches can occur from stress. At the onset of a headache, she will take Tylenol or Imitrex which will typically provide relief within a few hours. Headache description below:        HEADACHE DESCRIPTION:   These headaches are of a low-moderate intensity, pressure type, occurring in the bifrontal, temporal regions as well as neck and back pain. She is able to do her daily activities and this does result in interruption of work or other activities at home. There is no associated light or sound sensitivity or neurological deficits with this headache. This type of headache is reported to be constant, with dull tension type of pain. MIGRAINES ASSOCIATED WITH VISION LOSS:  Cliff Smyth continues have intermittent migraines. The date of her last migraine is unknown. She did not report any migraines since the last visit. At the onset of migraine she will take Imitrex when she noticed visual changes which will typically provide relief. Her vision is changes include a spot in her right eye. If she is able to take her Imitrex as soon as he is vision changes occur, she is able to abort her migraine. No concerns for nausea or vomiting. She can have light and sound sensitivity with her migraines. Migraine description provided below:      MIGRAINE DESCRIPTION:  She describe the pain to involve the bifrontal and temporal regions at an intensity of 8/10. Prior to the headache, she has had visual aura consisting of seeing flashing lights. Nausea or vomiting does not accompany the migraines. Cliff Smyth reports that initially she sees a small black spot, and this gradually gets larger with more vision loss, more so on the left side. She states that sometimes she can stop the progression of vision loss if she takes Aleve right away. She will prefer to go and sleep in a dark, quiet room and rest. She has needed to take 10-20 minute breaks from work at times. There is no associated numbness, tingling or weakness with these headaches. Rest gives partial relief of these headaches. Previous Medications Tried: Topamax (ineffective, irregular menses)     REVIEW OF SYSTEMS:  Constitutional: Negative. Eyes: Negative. Respiratory: Negative. Cardiovascular: Negative. Gastrointestinal: Negative. Genitourinary: Negative. Musculoskeletal: Neck stiffness    Skin: Negative. Neurological: positive for headaches and migraines, negative for seizures, negative for developmental delays, positive for fatigue and dizziness. Hematological: Negative. Psychiatric/Behavioral: negative for behavioral issues, positive for ADD. All other systems reviewed and are negative. Past, social, family, and developmental history was reviewed and unchanged. Objective:   PHYSICAL EXAM:   BP (!) 142/104   Pulse 82   Temp 97.9 °F (36.6 °C)   Resp 18   SpO2 100%    BP Rechecked 143/95     Neurological: she is alert and has normal strength and normal reflexes. she displays no atrophy, no tremor and normal reflexes. No cranial nerve deficit or sensory deficit. she exhibits normal muscle tone. she can stand and walk. she displays no seizure activity. Reflex Scores: 2+ diffuse. No focal weakness noted on exam.     Nursing note and vitals reviewed. Constitutional: she appears well-developed and well-nourished. HENT: Mouth/Throat: Mucous membranes are moist.   Eyes: EOM are normal. Pupils are equal, round, and reactive to light. Neck: Normal range of motion. Neck supple. Cardiovascular: Regular rhythm, S1 normal and S2 normal.   Pulmonary/Chest: Effort normal and breath sounds normal.   Lymph Nodes: No significant lymphadenopathy noted. Musculoskeletal: Normal range of motion. Neurological: she is alert and rest of the exam is as mentioned above. Skin: Skin is warm and dry. No lesions or ulcers. RECORD REVIEW: Previous medical records were reviewed at today's visit. DIAGNOSTIC STUDIES:  8/21/14 - MRI Brain - Normal   Results for Colin Centeno (MRN [id number]) as of 7/28/2021 09:41   Ref. Range 5/18/2021 15:01   [facility] Latest Ref Range: NEGATIVE  NEGATIVE     Assessment:   Robbie Heart is a 38 y.o. female with:   1. Fatigue, anxiety. Cliff Smyth would benefit from starting counseling and would like to proceed with starting a medication.  Treatment plans were discussed as well as potential side effects. 2. Chronic daily tension related headaches   3. Migraines with aura (associated with partial vision loss). The Imitrex helps but it takes around 20 minutes for this migraine to get aborted. 4. Concentration Deficit issues on some occasions. 5. Elevated blood pressure. Will continue to monitor. She was recommend to follow up with PCP in this regard. Plan:   1. Discontinue Amitriptyline  12.5 mg QHS. 2. I would zoloft 12.5 mg nightly for 7 days and then increase to 25 mg nightly to continue. 3. Continue CoQ 10 200 mg daily. 4. Monitor your blood pressure once daily for the next week. 5. Headache log was recommended to be maintained. 6. Naproxen 250 mg at onset of acute headache is recommended. It can be repeated in 6 hours if needed. 7. Migraine abortive medication is also recommended. Imitrex 25 mg tablets, take one tablet at onset of migraine. It is ok to use another dose after two hours if the migraine continues to persist.    8. I would recommend follow up in 2 months. Electronically signed by LENNY Salter CNP on 7/26/2021 at 4:06 PM            If you have any questions or concerns, please feel free to call me. Thank you again for referring this patient to be seen in our clinic.     Sincerely,        Kole Schmidt CNP

## 2021-07-26 NOTE — PATIENT INSTRUCTIONS
Plan:   1. Discontinue Amitriptyline  12.5 mg QHS. 2. I would zoloft 12.5 mg nightly for 7 days and then increase to 25 mg nightly to continue. 3. Continue CoQ 10 200 mg daily. 4. Monitor your blood pressure once daily for the next week. 5. Headache log was recommended to be maintained. 6. Naproxen 250 mg at onset of acute headache is recommended. It can be repeated in 6 hours if needed. 7. Migraine abortive medication is also recommended. Imitrex 25 mg tablets, take one tablet at onset of migraine. It is ok to use another dose after two hours if the migraine continues to persist.    8. I would recommend follow up in 2 months.

## 2021-07-26 NOTE — PROGRESS NOTES
It was a pleasure to see Ivan Bedolla at the Pediatric Neurology Clinic at [facility]. She is a 38 y.o. female  to this visit for a follow up neurological evaluation. INTERIM PROGRESS:  FATIGUE/ANXIETY:  Maryanne Brunner state that the fatigue and anxiety continues to persist and has been worse over the past few months. Maryanne Brunner states that she has been dealing with ending a long term relationship. She recently also had testing to determine of her Ovarian count and was told that she will not be able to have children without an egg donor. Jorje Irizarryak that this has been causing her sleep difficulties, stress and anxiety as she would like to become a mother. She has been having muscle pain in her upper back, shoulders and neck area and feels it is related to stress. Her blood pressure has also been elevated and Maryanne Brunner feels this is due to stress. Maryanne Brunner weaned herself from the Elavil. She has been feeling sad for the last several months and feels that she would benefit from starting medication for her anxiety, depression and fatigue. No concerns for suicidal, homicidal or self injurious behaviors    HEADACHES:   Maryanne Brunner states that she continues to have headaches. The headaches occur 1-2 times a month or less. She reports that the headaches can occur from stress. At the onset of a headache, she will take Tylenol or Imitrex which will typically provide relief within a few hours. Headache description below:        HEADACHE DESCRIPTION:   These headaches are of a low-moderate intensity, pressure type, occurring in the bifrontal, temporal regions as well as neck and back pain. She is able to do her daily activities and this does result in interruption of work or other activities at home. There is no associated light or sound sensitivity or neurological deficits with this headache. This type of headache is reported to be constant, with dull tension type of pain.       MIGRAINES ASSOCIATED WITH VISION LOSS:  Maryanne Brunner continues have intermittent migraines. The date of her last migraine is unknown. She did not report any migraines since the last visit. At the onset of migraine she will take Imitrex when she noticed visual changes which will typically provide relief. Her vision is changes include a spot in her right eye. If she is able to take her Imitrex as soon as he is vision changes occur, she is able to abort her migraine. No concerns for nausea or vomiting. She can have light and sound sensitivity with her migraines. Migraine description provided below:      MIGRAINE DESCRIPTION:  She describe the pain to involve the bifrontal and temporal regions at an intensity of 8/10. Prior to the headache, she has had visual aura consisting of seeing flashing lights. Nausea or vomiting does not accompany the migraines. Chely Darling reports that initially she sees a small black spot, and this gradually gets larger with more vision loss, more so on the left side. She states that sometimes she can stop the progression of vision loss if she takes Aleve right away. She will prefer to go and sleep in a dark, quiet room and rest. She has needed to take 10-20 minute breaks from work at times. There is no associated numbness, tingling or weakness with these headaches. Rest gives partial relief of these headaches. Previous Medications Tried: Topamax (ineffective, irregular menses)     REVIEW OF SYSTEMS:  Constitutional: Negative. Eyes: Negative. Respiratory: Negative. Cardiovascular: Negative. Gastrointestinal: Negative. Genitourinary: Negative. Musculoskeletal: Neck stiffness    Skin: Negative. Neurological: positive for headaches and migraines, negative for seizures, negative for developmental delays, positive for fatigue and dizziness. Hematological: Negative. Psychiatric/Behavioral: negative for behavioral issues, positive for ADD. All other systems reviewed and are negative.   Past, social, family, and developmental history was reviewed and unchanged. Objective:   PHYSICAL EXAM:   BP (!) 142/104   Pulse 82   Temp 97.9 °F (36.6 °C)   Resp 18   SpO2 100%    BP Rechecked 143/95     Neurological: she is alert and has normal strength and normal reflexes. she displays no atrophy, no tremor and normal reflexes. No cranial nerve deficit or sensory deficit. she exhibits normal muscle tone. she can stand and walk. she displays no seizure activity. Reflex Scores: 2+ diffuse. No focal weakness noted on exam.     Nursing note and vitals reviewed. Constitutional: she appears well-developed and well-nourished. HENT: Mouth/Throat: Mucous membranes are moist.   Eyes: EOM are normal. Pupils are equal, round, and reactive to light. Neck: Normal range of motion. Neck supple. Cardiovascular: Regular rhythm, S1 normal and S2 normal.   Pulmonary/Chest: Effort normal and breath sounds normal.   Lymph Nodes: No significant lymphadenopathy noted. Musculoskeletal: Normal range of motion. Neurological: she is alert and rest of the exam is as mentioned above. Skin: Skin is warm and dry. No lesions or ulcers. RECORD REVIEW: Previous medical records were reviewed at today's visit. DIAGNOSTIC STUDIES:  8/21/14 - MRI Brain - Normal   Results for Joanne Hooker (MRN [id number]) as of 7/28/2021 09:41   Ref. Range 5/18/2021 15:01   [facility] Latest Ref Range: NEGATIVE  NEGATIVE     Assessment:   Alyssa Pierce is a 38 y.o. female with:   1. Fatigue, anxiety. Lazarus Cheers would benefit from starting counseling and would like to proceed with starting a medication. Treatment plans were discussed as well as potential side effects. 2. Chronic daily tension related headaches   3. Migraines with aura (associated with partial vision loss). The Imitrex helps but it takes around 20 minutes for this migraine to get aborted. 4. Concentration Deficit issues on some occasions. 5. Elevated blood pressure. Will continue to monitor.  She was recommend to follow up with PCP in this regard. Plan:   1. Discontinue Amitriptyline  12.5 mg QHS. 2. I would zoloft 12.5 mg nightly for 7 days and then increase to 25 mg nightly to continue. 3. Continue CoQ 10 200 mg daily. 4. Monitor your blood pressure once daily for the next week. 5. Headache log was recommended to be maintained. 6. Naproxen 250 mg at onset of acute headache is recommended. It can be repeated in 6 hours if needed. 7. Migraine abortive medication is also recommended. Imitrex 25 mg tablets, take one tablet at onset of migraine. It is ok to use another dose after two hours if the migraine continues to persist.    8. I would recommend follow up in 2 months.            Electronically signed by LENNY Villalobos CNP on 7/26/2021 at 4:06 PM

## 2021-08-03 LAB
CHOLESTEROL/HDL RATIO: 3.3
CHOLESTEROL: 211 MG/DL
GLUCOSE BLD-MCNC: 93 MG/DL (ref 70–99)
HDLC SERPL-MCNC: 63 MG/DL
LDL CHOLESTEROL: 136 MG/DL (ref 0–130)
PATIENT FASTING?: YES
TRIGL SERPL-MCNC: 59 MG/DL
VLDLC SERPL CALC-MCNC: ABNORMAL MG/DL (ref 1–30)

## 2021-08-25 ENCOUNTER — TELEPHONE (OUTPATIENT)
Dept: PEDIATRIC NEUROLOGY | Age: 41
End: 2021-08-25

## 2021-08-25 RX ORDER — SERTRALINE HYDROCHLORIDE 25 MG/1
TABLET, FILM COATED ORAL
Qty: 30 TABLET | Refills: 3 | Status: CANCELLED | OUTPATIENT
Start: 2021-08-25

## 2021-08-25 RX ORDER — SERTRALINE HYDROCHLORIDE 25 MG/1
TABLET, FILM COATED ORAL
Qty: 30 TABLET | Refills: 3 | Status: SHIPPED | OUTPATIENT
Start: 2021-08-25 | End: 2021-11-22 | Stop reason: SDUPTHER

## 2021-08-31 ENCOUNTER — OFFICE VISIT (OUTPATIENT)
Dept: PRIMARY CARE CLINIC | Age: 41
End: 2021-08-31
Payer: COMMERCIAL

## 2021-08-31 ENCOUNTER — OFFICE VISIT (OUTPATIENT)
Dept: OBGYN CLINIC | Age: 41
End: 2021-08-31
Payer: COMMERCIAL

## 2021-08-31 VITALS
HEIGHT: 69 IN | RESPIRATION RATE: 16 BRPM | OXYGEN SATURATION: 99 % | SYSTOLIC BLOOD PRESSURE: 134 MMHG | WEIGHT: 200.2 LBS | BODY MASS INDEX: 29.65 KG/M2 | DIASTOLIC BLOOD PRESSURE: 94 MMHG | HEART RATE: 95 BPM

## 2021-08-31 VITALS
WEIGHT: 199.38 LBS | BODY MASS INDEX: 29.3 KG/M2 | DIASTOLIC BLOOD PRESSURE: 64 MMHG | SYSTOLIC BLOOD PRESSURE: 120 MMHG | RESPIRATION RATE: 16 BRPM

## 2021-08-31 DIAGNOSIS — R23.2 HOT FLASHES: ICD-10-CM

## 2021-08-31 DIAGNOSIS — Z76.89 ENCOUNTER TO ESTABLISH CARE: ICD-10-CM

## 2021-08-31 DIAGNOSIS — M53.80 BACK TIGHTNESS: ICD-10-CM

## 2021-08-31 DIAGNOSIS — G43.109 MIGRAINE WITH AURA AND WITHOUT STATUS MIGRAINOSUS, NOT INTRACTABLE: ICD-10-CM

## 2021-08-31 DIAGNOSIS — N92.0 MENORRHAGIA WITH REGULAR CYCLE: Primary | ICD-10-CM

## 2021-08-31 DIAGNOSIS — D25.9 UTERINE LEIOMYOMA, UNSPECIFIED LOCATION: ICD-10-CM

## 2021-08-31 DIAGNOSIS — R03.0 ELEVATED BP WITHOUT DIAGNOSIS OF HYPERTENSION: Primary | ICD-10-CM

## 2021-08-31 DIAGNOSIS — Z13.0 SCREENING, IRON DEFICIENCY ANEMIA: ICD-10-CM

## 2021-08-31 DIAGNOSIS — R93.89 ABNORMAL ULTRASOUND: ICD-10-CM

## 2021-08-31 PROCEDURE — 99204 OFFICE O/P NEW MOD 45 MIN: CPT | Performed by: NURSE PRACTITIONER

## 2021-08-31 PROCEDURE — 99213 OFFICE O/P EST LOW 20 MIN: CPT | Performed by: OBSTETRICS & GYNECOLOGY

## 2021-08-31 RX ORDER — TIZANIDINE 2 MG/1
2 TABLET ORAL NIGHTLY PRN
Qty: 30 TABLET | Refills: 3 | Status: SHIPPED | OUTPATIENT
Start: 2021-08-31 | End: 2021-10-26

## 2021-08-31 SDOH — ECONOMIC STABILITY: TRANSPORTATION INSECURITY
IN THE PAST 12 MONTHS, HAS LACK OF TRANSPORTATION KEPT YOU FROM MEETINGS, WORK, OR FROM GETTING THINGS NEEDED FOR DAILY LIVING?: NO

## 2021-08-31 SDOH — ECONOMIC STABILITY: FOOD INSECURITY: WITHIN THE PAST 12 MONTHS, YOU WORRIED THAT YOUR FOOD WOULD RUN OUT BEFORE YOU GOT MONEY TO BUY MORE.: NEVER TRUE

## 2021-08-31 SDOH — ECONOMIC STABILITY: TRANSPORTATION INSECURITY
IN THE PAST 12 MONTHS, HAS THE LACK OF TRANSPORTATION KEPT YOU FROM MEDICAL APPOINTMENTS OR FROM GETTING MEDICATIONS?: NO

## 2021-08-31 SDOH — ECONOMIC STABILITY: FOOD INSECURITY: WITHIN THE PAST 12 MONTHS, THE FOOD YOU BOUGHT JUST DIDN'T LAST AND YOU DIDN'T HAVE MONEY TO GET MORE.: NEVER TRUE

## 2021-08-31 ASSESSMENT — ENCOUNTER SYMPTOMS
VOMITING: 0
NAUSEA: 0
CHEST TIGHTNESS: 0
SORE THROAT: 0
RHINORRHEA: 0
DIARRHEA: 0
BACK PAIN: 1
ABDOMINAL PAIN: 0
SHORTNESS OF BREATH: 0
COLOR CHANGE: 0

## 2021-08-31 ASSESSMENT — PATIENT HEALTH QUESTIONNAIRE - PHQ9
2. FEELING DOWN, DEPRESSED OR HOPELESS: 0
SUM OF ALL RESPONSES TO PHQ9 QUESTIONS 1 & 2: 0
SUM OF ALL RESPONSES TO PHQ QUESTIONS 1-9: 0
1. LITTLE INTEREST OR PLEASURE IN DOING THINGS: 0
SUM OF ALL RESPONSES TO PHQ QUESTIONS 1-9: 0
SUM OF ALL RESPONSES TO PHQ QUESTIONS 1-9: 0

## 2021-08-31 ASSESSMENT — SOCIAL DETERMINANTS OF HEALTH (SDOH): HOW HARD IS IT FOR YOU TO PAY FOR THE VERY BASICS LIKE FOOD, HOUSING, MEDICAL CARE, AND HEATING?: NOT VERY HARD

## 2021-08-31 NOTE — PROGRESS NOTES
[address] PRIMARY CARE  [address] [place]  [address] [address]  Dept: [phone]  Dept Fax: [phone]    Cedric Lovelace is a 42 y.o. female who presentstoday for her medical conditions/complaints as noted below. Cedric Lovelace is c/o of  Chief Complaint   Patient presents with   [facility] Establish Care     muscle tightness of neck, upper ches, and middle back x 2 months, it getting massages weekly          HPI:     Here to establish care as new patient, has not been following with PCP  PMH significant for high blood pressure, no currently on medications, has improved on recheck generally but remains around 130s/80-90s on average, does note that when she exercises regularly, BP generally better. She has not been doing cardio as much and would like to increase exercise to see if this helps before going on medication. 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 Has acute complaint of muscle stiffness in back and shoulders, was getting massages and this did help some but continues to have issue, worsening over last 2 months   Does have increased stress and tension, this may be contributing.   Pain does not radiate, denies muscle weakness, no numbness or tingling   No known injury or trauma    Had Be Well Within screen but will rule out thyroid dysfunction and check CBC       No results found for: LABA1C          ( goal A1C is < 7)   No results found for: LABMICR  LDL Cholesterol (mg/dL)   Date Value   2021 136 (H)   2019 129   2018 121       (goal LDL is <100) AST (U/L)   Date Value   08/08/2014 21     ALT (U/L)   Date Value   08/08/2014 18     BUN (mg/dL)   Date Value   09/30/2016 9     BP Readings from Last 3 Encounters:   08/31/21 (!) 134/94   08/31/21 120/64   07/26/21 (!) 142/104          ([id number])    Past Medical History:   Diagnosis Date    Fatigue     Hemorrhoid       Past Surgical History:   Procedure Laterality Date    COLONOSCOPY  4-1-16    hemorrhoids    EYE SURGERY Bilateral     LISIK    HEMORRHOID SURGERY  10/14/2016    hemorrhoidectomy    LASIK Bilateral     TYMPANOSTOMY TUBE PLACEMENT      x 3 school age       Family History   Problem Relation Age of Onset    High Blood Pressure Father     Schizophrenia Father     OCD Father     Macular Degen Maternal Grandmother     Colon Cancer Maternal Grandfather     Heart Attack Maternal Grandfather     Stroke Paternal Grandmother     Breast Cancer Paternal Grandmother     Heart Attack Paternal Grandfather        Social History     Tobacco Use    Smoking status: Never Smoker    Smokeless tobacco: Never Used   Substance Use Topics    Alcohol use: Yes     Comment: sociably      Current Outpatient Medications   Medication Sig Dispense Refill    tiZANidine (ZANAFLEX) 2 MG tablet Take 1 tablet by mouth nightly as needed (back tightness) 30 tablet 3    sertraline (ZOLOFT) 25 MG tablet Take 25 mg tablet daily. 30 tablet 3     No current facility-administered medications for this visit.      No Known Allergies    Health Maintenance   Topic Date Due    Hepatitis C screen  Never done    HIV screen  Never done    DTaP/Tdap/Td vaccine (6 - Tdap) 12/15/2000    Flu vaccine (1) 09/01/2021    Cervical cancer screen  05/18/2026    Lipid screen  08/03/2026    COVID-19 Vaccine  Completed    Hepatitis A vaccine  Aged Out    Hepatitis B vaccine  Aged Out    Hib vaccine  Aged Out    Meningococcal (ACWY) vaccine  Aged Out    Pneumococcal 0-64 years Vaccine  Aged Out       Subjective:      Review of Systems   Constitutional: Negative for activity change, fatigue and fever. HENT: Negative for congestion, rhinorrhea and sore throat. Eyes: Negative for visual disturbance. Respiratory: Negative for chest tightness and shortness of breath. Cardiovascular: Negative for chest pain and palpitations. Gastrointestinal: Negative for abdominal pain, diarrhea, nausea and vomiting. Endocrine: Negative for polydipsia. Genitourinary: Negative for difficulty urinating. Musculoskeletal: Positive for back pain, myalgias and neck stiffness. Negative for arthralgias. Skin: Negative for color change. Neurological: Negative for weakness and headaches. Psychiatric/Behavioral: Negative for behavioral problems. The patient is not nervous/anxious. All other systems reviewed and are negative. Objective:   BP (!) 134/94 (Site: Left Upper Arm, Position: Sitting, Cuff Size: Large Adult)   Pulse 95   Resp 16   Ht 5' 9\" (1.753 m)   Wt 200 lb 3.2 oz (90.8 kg)   SpO2 99%   Breastfeeding No   BMI 29.56 kg/m²   Physical Exam  Vitals reviewed. Constitutional:       General: She is not in acute distress. Appearance: Normal appearance. HENT:      Head: Normocephalic. Eyes:      Pupils: Pupils are equal, round, and reactive to light. Cardiovascular:      Rate and Rhythm: Normal rate and regular rhythm. Pulses: Normal pulses. Heart sounds: Normal heart sounds. Pulmonary:      Effort: Pulmonary effort is normal.      Breath sounds: Normal breath sounds. Abdominal:      General: Bowel sounds are normal. There is no distension. Palpations: Abdomen is soft. Musculoskeletal:         General: Normal range of motion. Cervical back: Normal and neck supple. Thoracic back: Normal.      Lumbar back: Normal.      Right lower leg: No edema. Left lower leg: No edema. Lymphadenopathy:      Cervical: No cervical adenopathy. Skin:     General: Skin is warm and dry.       Capillary Refill: Capillary refill takes less than 2 seconds. Neurological:      General: No focal deficit present. Mental Status: She is alert and oriented to person, place, and time. Psychiatric:         Mood and Affect: Mood normal.         Behavior: Behavior normal.           :       Diagnosis Orders   1. Elevated BP without diagnosis of hypertension  TSH without Reflex   2. Encounter to establish care     3. Hot flashes  TSH without Reflex   4. Screening, iron deficiency anemia  CBC With Auto Differential   5. Back tightness  tiZANidine (ZANAFLEX) 2 MG tablet   6. Migraine with aura and without status migrainosus, not intractable               :          1. Elevated BP without diagnosis of hypertension  Discussed HTN diagnosis, has had multiple high readings on different occasions. Ok to increase activity and limit salt, recheck in 2 weeks. Advised to check BP at home and log readings so we can calculate average. Rule out thyroid  - TSH without Reflex; Future    2. Hot flashes  Intermittent, check thyroid. - TSH without Reflex; Future    3. Screening, iron deficiency anemia  - CBC With Auto Differential; Future    4. Back tightness  Worsening, trial zanaflex nightly PRN. Continue exercise as able, home stretching and topical muscle rubs. May also consider chiropractic manipulation if persists    - tiZANidine (ZANAFLEX) 2 MG tablet; Take 1 tablet by mouth nightly as needed (back tightness)  Dispense: 30 tablet; Refill: 3    6. Migraine with aura  Following with neuro, stable    Following with GYN    Return in about 2 weeks (around 9/14/2021) for Hypertension. Patient given educational materials - see patient instructions. Discussed use, benefit, and side effects of prescribed medications. All patient questions answered. Pt voiced understanding. Reviewed health maintenance. Instructed to continue current medications, diet and exercise. Patient agreed with treatment plan. Follow up as directed. Electronicallysigned by LENNY Arcos - CNP on 8/31/2021 at 7:04 PM

## 2021-08-31 NOTE — PATIENT INSTRUCTIONS

## 2021-08-31 NOTE — PROGRESS NOTES
Javier Casper  8/31/2021    YOB: 1980          The patient was seen today. She is here regarding plan of care . Her bowels are regular and she is voiding without difficulty. HPI:  Javier Casper is a 42 y.o. female G0    Pt. Recently met with Dr. Sabiha Ryan and was told that she would need to have egg donor and transfer if she would like to carry a pregnancy. She is grieving appropriately and does not know what she should do. However she does want to proceed with Hscope, Myosure myomectomy. Discussed R/B/A of procedure however I do believe this will aid in success rates should she decide to move forward with egg donation. Reassurance and support given. She has history of HMB and abnormal utlrasound with possible intrauterine fibroid. OB History   No obstetric history on file. Past Medical History:   Diagnosis Date    Fatigue     Hemorrhoid        Past Surgical History:   Procedure Laterality Date    COLONOSCOPY  4-1-16    hemorrhoids    EYE SURGERY Bilateral     LISIK    HEMORRHOID SURGERY  10/14/2016    hemorrhoidectomy    LASIK Bilateral     TYMPANOSTOMY TUBE PLACEMENT      x 3 school age       Family History   Problem Relation Age of Onset    High Blood Pressure Father     Schizophrenia Father     OCD Father     Macular Degen Maternal Grandmother     Colon Cancer Maternal Grandfather     Heart Attack Maternal Grandfather     Stroke Paternal Grandmother     Breast Cancer Paternal Grandmother     Heart Attack Paternal Grandfather        Social History     Socioeconomic History    Marital status: Single     Spouse name: Not on file    Number of children: Not on file    Years of education: Not on file    Highest education level: Not on file   Occupational History    Not on file   Tobacco Use    Smoking status: Never Smoker    Smokeless tobacco: Never Used   Vaping Use    Vaping Use: Never used   Substance and Sexual Activity    Alcohol use:  Yes Comment: sociably    Drug use: No    Sexual activity: Not on file   Other Topics Concern    Not on file   Social History Narrative    Not on file     Social Determinants of Health     Financial Resource Strain: Low Risk     Difficulty of Paying Living Expenses: Not very hard   Food Insecurity: No Food Insecurity    Worried About Running Out of Food in the Last Year: Never true    Huma of Food in the Last Year: Never true   Transportation Needs: No Transportation Needs    Lack of Transportation (Medical): No    Lack of Transportation (Non-Medical): No   Physical Activity:     Days of Exercise per Week:     Minutes of Exercise per Session:    Stress:     Feeling of Stress :    Social Connections:     Frequency of Communication with Friends and Family:     Frequency of Social Gatherings with Friends and Family:     Attends [employer] Services:     Active Member of Clubs or Organizations:     Attends Club or Organization Meetings:     Marital Status:    Intimate Partner Violence:     Fear of Current or Ex-Partner:     Emotionally Abused:     Physically Abused:     Sexually Abused:          MEDICATIONS:  Current Outpatient Medications   Medication Sig Dispense Refill    tiZANidine (ZANAFLEX) 2 MG tablet Take 1 tablet by mouth nightly as needed (back tightness) 30 tablet 3    sertraline (ZOLOFT) 25 MG tablet Take 25 mg tablet daily. 30 tablet 3     No current facility-administered medications for this visit. ALLERGIES:  Allergies as of 08/31/2021    (No Known Allergies)         REVIEW OF SYSTEMS:       A minimum of an eleven point review of systems was completed. Review Of Systems (11 point):  Constitutional: No fever, chills or malaise;  No weight change or fatigue  Head and Eyes: No vision, Headache, Dizziness or trauma in last 12 months  ENT ROS: No hearing, Tinnitis, sinus or taste problems  Hematological and Lymphatic ROS:No Lymphoma, Von Willebrand's, Hemophillia or Bleeding aura 08/04/2014    Fatigue 08/04/2014    Dizziness 08/04/2014    Neck stiffness 08/04/2014       PLAN:  Return in about 6 months (around 2/28/2022) for follow up plan of care for egg donation? .  Follow up in 4-6 months  RMA referral for consultation  Recommend moving forward with Hscope, D&C, Myosure for fibroid uterus  Repeat Annual every 1 year  Cervical Cytology Evaluation begins at 24years old. If Negative Cytology, Follow-up screening per current guidelines. Counseled on preventative health maintenance follow-up. No orders of the defined types were placed in this encounter. The patient, Malaika Gomez is a 42 y.o. female, was seen with a total time spent of 20 minutes for the visit on this date of service by the E/M provider. The time component had both face to face and non face to face time spent in determining the total time component. Counseling and education regarding her diagnosis listed below and her options regarding those diagnoses were also included in determining her time component. Diagnosis Orders   1. Menorrhagia with regular cycle     2. Abnormal ultrasound     3. Uterine leiomyoma, unspecified location          The patient had her preventative health maintenance recommendations and follow-up reviewed with her at the completion of her visit.

## 2021-09-09 ENCOUNTER — PREP FOR PROCEDURE (OUTPATIENT)
Dept: OBGYN CLINIC | Age: 41
End: 2021-09-09

## 2021-09-09 DIAGNOSIS — Z01.818 PRE-OP TESTING: Primary | ICD-10-CM

## 2021-09-13 NOTE — PROGRESS NOTES
Preoperative Instructions:    Stop eating solid foods at midnight the night prior to your surgery. Stop drinking clear liquids at midnight the night prior to your surgery. Arrive at the surgery center (3rd entrance) f[facility] ___[phone]____________ by _____[phone]__________. Please stop any blood thinning medications as directed by your surgeon or prescribing physician. Failure to stop certain medications may interfere with your scheduled surgery. These may include: Aspirin, Coumadin, Plavix, NSAIDS (Motrin, Aleve, Advil, Mobic, Celebrex), Eliquis, Pradaxa, Xarelto, Fish oil, and herbal supplements. You may continue the rest of your medications through the night before surgery unless instructed otherwise. Day of surgery please take only the following medication(s) with a small sip of water:None      Please shower with antibacterial soap and water the night before and the morning of surgery. Reminders:  -If you are going home the day of your procedure, you will need a family member or friend to stay during the procedure and drive you home after your procedure. Your  must be 25years of age or older and able to sign off on your discharge instructions.    -If you are going home the same day of your surgery, someone must remain with you for the first 24 hours after your surgery if you receive sedation or anesthesia.      -Please do not wear any jewelery, contacts, lotions  or body piercing the day of surgery

## 2021-09-14 ENCOUNTER — HOSPITAL ENCOUNTER (OUTPATIENT)
Age: 41
Setting detail: SPECIMEN
Discharge: HOME | End: 2021-09-14
Payer: COMMERCIAL

## 2021-09-14 DIAGNOSIS — Z13.0 SCREENING, IRON DEFICIENCY ANEMIA: ICD-10-CM

## 2021-09-14 DIAGNOSIS — R23.2 HOT FLASHES: ICD-10-CM

## 2021-09-14 DIAGNOSIS — Z01.818 PRE-OP TESTING: ICD-10-CM

## 2021-09-14 DIAGNOSIS — R03.0 ELEVATED BP WITHOUT DIAGNOSIS OF HYPERTENSION: ICD-10-CM

## 2021-09-14 LAB
ABSOLUTE EOS #: 0.37 K/UL (ref 0–0.44)
ABSOLUTE EOS #: 0.37 K/UL (ref 0–0.44)
ABSOLUTE IMMATURE GRANULOCYTE: 0.03 K/UL (ref 0–0.3)
ABSOLUTE IMMATURE GRANULOCYTE: 0.03 K/UL (ref 0–0.3)
ABSOLUTE LYMPH #: 2.3 K/UL (ref 1.1–3.7)
ABSOLUTE LYMPH #: 2.3 K/UL (ref 1.1–3.7)
ABSOLUTE MONO #: 0.49 K/UL (ref 0.1–1.2)
ABSOLUTE MONO #: 0.49 K/UL (ref 0.1–1.2)
ALBUMIN SERPL-MCNC: 4.6 G/DL (ref 3.5–5.2)
ALBUMIN/GLOBULIN RATIO: 1.4 (ref 1–2.5)
ALP BLD-CCNC: 100 U/L (ref 35–104)
ALT SERPL-CCNC: 19 U/L (ref 5–33)
ANION GAP SERPL CALCULATED.3IONS-SCNC: 18 MMOL/L (ref 9–17)
AST SERPL-CCNC: 24 U/L
BASOPHILS # BLD: 1 % (ref 0–2)
BASOPHILS # BLD: 1 % (ref 0–2)
BASOPHILS ABSOLUTE: 0.04 K/UL (ref 0–0.2)
BASOPHILS ABSOLUTE: 0.04 K/UL (ref 0–0.2)
BILIRUB SERPL-MCNC: 0.42 MG/DL (ref 0.3–1.2)
BILIRUBIN URINE: NEGATIVE
BUN BLDV-MCNC: 12 MG/DL (ref 6–20)
BUN/CREAT BLD: ABNORMAL (ref 9–20)
CALCIUM SERPL-MCNC: 9.7 MG/DL (ref 8.6–10.4)
CHLORIDE BLD-SCNC: 103 MMOL/L (ref 98–107)
CO2: 20 MMOL/L (ref 20–31)
COLOR: YELLOW
COMMENT UA: NORMAL
CREAT SERPL-MCNC: 0.75 MG/DL (ref 0.5–0.9)
DIFFERENTIAL TYPE: ABNORMAL
DIFFERENTIAL TYPE: ABNORMAL
EOSINOPHILS RELATIVE PERCENT: 5 % (ref 1–4)
EOSINOPHILS RELATIVE PERCENT: 5 % (ref 1–4)
GFR AFRICAN AMERICAN: >60 ML/MIN
GFR NON-AFRICAN AMERICAN: >60 ML/MIN
GFR SERPL CREATININE-BSD FRML MDRD: ABNORMAL ML/MIN/{1.73_M2}
GFR SERPL CREATININE-BSD FRML MDRD: ABNORMAL ML/MIN/{1.73_M2}
GLUCOSE BLD-MCNC: 127 MG/DL (ref 70–99)
GLUCOSE URINE: NEGATIVE
HCG QUANTITATIVE: <1 IU/L
HCT VFR BLD CALC: 38.5 % (ref 36.3–47.1)
HCT VFR BLD CALC: 38.5 % (ref 36.3–47.1)
HEMOGLOBIN: 12.3 G/DL (ref 11.9–15.1)
HEMOGLOBIN: 12.3 G/DL (ref 11.9–15.1)
IMMATURE GRANULOCYTES: 0 %
IMMATURE GRANULOCYTES: 0 %
KETONES, URINE: NEGATIVE
LEUKOCYTE ESTERASE, URINE: NEGATIVE
LYMPHOCYTES # BLD: 33 % (ref 24–43)
LYMPHOCYTES # BLD: 33 % (ref 24–43)
MCH RBC QN AUTO: 27.6 PG (ref 25.2–33.5)
MCH RBC QN AUTO: 27.6 PG (ref 25.2–33.5)
MCHC RBC AUTO-ENTMCNC: 31.9 G/DL (ref 28.4–34.8)
MCHC RBC AUTO-ENTMCNC: 31.9 G/DL (ref 28.4–34.8)
MCV RBC AUTO: 86.5 FL (ref 82.6–102.9)
MCV RBC AUTO: 86.5 FL (ref 82.6–102.9)
MONOCYTES # BLD: 7 % (ref 3–12)
MONOCYTES # BLD: 7 % (ref 3–12)
NITRITE, URINE: NEGATIVE
NRBC AUTOMATED: 0 PER 100 WBC
NRBC AUTOMATED: 0 PER 100 WBC
PDW BLD-RTO: 13.5 % (ref 11.8–14.4)
PDW BLD-RTO: 13.5 % (ref 11.8–14.4)
PH UA: 5.5 (ref 5–8)
PLATELET # BLD: 253 K/UL (ref 138–453)
PLATELET # BLD: 253 K/UL (ref 138–453)
PLATELET ESTIMATE: ABNORMAL
PLATELET ESTIMATE: ABNORMAL
PMV BLD AUTO: 11.5 FL (ref 8.1–13.5)
PMV BLD AUTO: 11.5 FL (ref 8.1–13.5)
POTASSIUM SERPL-SCNC: 4.2 MMOL/L (ref 3.7–5.3)
PROTEIN UA: NEGATIVE
RBC # BLD: 4.45 M/UL (ref 3.95–5.11)
RBC # BLD: 4.45 M/UL (ref 3.95–5.11)
RBC # BLD: ABNORMAL 10*6/UL
RBC # BLD: ABNORMAL 10*6/UL
SEG NEUTROPHILS: 54 % (ref 36–65)
SEG NEUTROPHILS: 54 % (ref 36–65)
SEGMENTED NEUTROPHILS ABSOLUTE COUNT: 3.75 K/UL (ref 1.5–8.1)
SEGMENTED NEUTROPHILS ABSOLUTE COUNT: 3.75 K/UL (ref 1.5–8.1)
SODIUM BLD-SCNC: 141 MMOL/L (ref 135–144)
SPECIFIC GRAVITY UA: 1.01 (ref 1–1.03)
TOTAL PROTEIN: 7.8 G/DL (ref 6.4–8.3)
TSH SERPL DL<=0.05 MIU/L-ACNC: 1.42 MIU/L (ref 0.3–5)
TURBIDITY: CLEAR
URINE HGB: NEGATIVE
UROBILINOGEN, URINE: NORMAL
WBC # BLD: 7 K/UL (ref 3.5–11.3)
WBC # BLD: 7 K/UL (ref 3.5–11.3)
WBC # BLD: ABNORMAL 10*3/UL
WBC # BLD: ABNORMAL 10*3/UL

## 2021-09-20 ENCOUNTER — ANESTHESIA EVENT (OUTPATIENT)
Dept: OPERATING ROOM | Age: 41
End: 2021-09-20
Payer: COMMERCIAL

## 2021-09-20 NOTE — OP NOTE
Operative Note  Department of Obstetrics and Gynecology  [facility]       Patient: Laci Goodson   : 1980  MRN: [id number]       Acct: [de-identified]   PCP: LENNY Cuellar CNP  Date of Procedure: 21    Pre-operative Diagnosis: 42 y.o. female AUB  Fibroid Uterus    Post-operative Diagnosis: same,   Submucosal fibroid    Procedure: Hysteroscopy, Dilation and Curettage, Myosure    Surgeon: Dr. Perla Ramos  Assistants: Risa Mcgee, ; PGY 2    Anesthesia: general    Indications: Patient is a 42year old with a history of AUB. She had a TVUS that showed multiple fibroids with a markedly increased endometrial stripe with possible submucosal fibroid. Patient is desiring surgical management for her AUB. Procedure Details: The patient was seen in the pre-op room. The risks, benefits, complications, treatment options, and expected outcomes were discussed with the patient. The patient concurred with the proposed plan, giving informed consent. The patient was taken to the Operating Room and identified as Laci Goodson and the procedure was verified. A Time Out was held and the above information confirmed. After administration of general anesthesia, the patient was placed in the dorsolithotomy position with [place] stirrups and examination under general anesthesia performed with findings as noted below. The patient was prepped and draped in the usual sterile fashion. A weighted speculum was placed into the vagina to visualize the cervix. The cervix was not grasped with a marques. The uterus and the cervix were sounded and measured 9 cm. The cervix was dilated with hegar dilators to size 8. Hysteroscope was inserted into the uterine cavity showing normal appearing endocervical and endometrial cavity. Submucosal fibroid noted. Myosure device was used to remove fibroid with excellent visualization. All instruments were then removed.  Hemostasis was not visualized at the tenaculum site on the cervix. Instrument, sponge, and needle counts were correct at the conclusion of the case. SCDs for DVT prophylaxis remain in place for the post operative period. Dr. Jeniffer Muro was present for the entire operation. Findings:  Normal appearing external genitalia. Normal appearing vaginal mucosa. Normal appearing cervix with no lesions or discharge. On hysteroscopy normal appearing endometrial and endocervical canal, submucosal fibroid noted, normal appearing b/l tubal ostea. Estimated Blood Loss: 15 ml  Drains:  None  Total IV Fluids: 1500 ml  Urine output: patient used the restroom prior to the procedure  Specimens:  myosure endometrial curettings/fibroid  Instrument and Sponge Count: Correct  Complications: none  Condition: stable, transfer to post anesthesia recovery    Risa Mcgee DO  Ob/Gyn Resident  2021, 9:37 AM        Date: 2021  Time: 3:22 PM    Patient Name: Laci Key : 1980  Room/Bed: [facility] [place] RM/NONE  Admission Date/Time: 2021  6:40 AM  MRN #: [id number]  [facility] #: [id number]      Attending Attestation:   I was present and scrubbed for the entire procedure.     Attending Name:  Linda Esparza DO

## 2021-09-20 NOTE — H&P
OB/GYN Pre-Op H&P  [facility]    Patient Name: Mack Herzog     Patient : 1980  Room/Bed: [facility] OR [facility]/NONE  Admission Date/Time: 2021  6:40 AM  Primary Care Physician: LENNY Ca CNP  MRN: [id number]    Date: 2021  Time: 7:32 AM    The patient was seen in pre-op holding. She is here for Hysteroscopy Dilation and Curettage with possible Myosure. Patient is a 42year old with a history of AUB. She had a TVUS that showed multiple fibroids with a markedly increased endometrial stripe with possible submucosal fibroid. Patient is desiring surgical management for her AUB. The procedure risks and complications were reviewed. The labs, Consent, and H&P were reviewed and updated. The patient was counseled on the possibility of  the need of a second surgery. The patient voiced understanding and had all of her questions answered. The possibility of incomplete removal of abnormal tissue was discussed. OBSTETRICAL HISTORY:   OB History   No obstetric history on file. PAST MEDICAL HISTORY:   has a past medical history of Fatigue and Hemorrhoid. PAST SURGICAL HISTORY:   has a past surgical history that includes Tympanostomy tube placement; Colonoscopy (-16); LASIK (Bilateral); eye surgery (Bilateral); and Hemorrhoid surgery (10/14/2016).     ALLERGIES:  Allergies as of 2021    (No Known Allergies)       MEDICATIONS:  Current Facility-Administered Medications   Medication Dose Route Frequency Provider Last Rate Last Admin    0.9 % sodium chloride infusion   IntraVENous Continuous Felton Recio MD        lactated ringers infusion   IntraVENous Continuous Felton Recio  mL/hr at 21 0719 New Bag at 21 0719    sodium chloride flush 0.9 % injection 10 mL  10 mL IntraVENous 2 times per day Felton Recio MD        sodium chloride flush 0.9 % injection 10 mL  10 mL IntraVENous PRN Felton Recio MD        0.9 % sodium chloride infusion  25 mL IntraVENous PRN Leila Borden MD           FAMILY HISTORY:  family history includes Breast Cancer in her paternal grandmother; Colon Cancer in her maternal grandfather; Heart Attack in her maternal grandfather and paternal grandfather; High Blood Pressure in her father; Jud La in her maternal grandmother; OCD in her father; Schizophrenia in her father; Stroke in her paternal grandmother. SOCIAL HISTORY:   reports that she has never smoked. She has never used smokeless tobacco. She reports current alcohol use. She reports that she does not use drugs.     VITALS:  Vitals:    09/21/21 0650 09/21/21 0707   BP:  (!) 133/97   Pulse:  74   Resp:  16   Temp:  98.1 °F (36.7 °C)   TempSrc:  Temporal   SpO2:  100%   Weight: 201 lb 2 oz (91.2 kg)    Height: 5' 10\" (1.778 m)                                                                                                                                PHYSICAL EXAM:     Unchanged from Prior H&P  CONSTITUTIONAL:  Alert and oriented, no acute distress  HEAD: normocephalic, atraumatic  EYES: Pupils equal and reactive to light, Extraocular muscles intact, sclera non icteric  ENT: Mucus membranes moist, No otorrhea, no rhinorrhea  NECK:  supple, symmetrical, trachea midline   LUNGS:  Good air movement bilaterally, unlabored respirations, no wheezes or rhonchi  CARDIOVASCULAR: Regular rate and rhythm, no murmurs rubs or gallops  ABDOMEN: soft, non tender, non distended, no rebound or guarding, no hernias, no hepatomegaly, no splenomegly  MUSCULOSKELETAL:  Equal strength bilaterally, normal muscle tone  SKIN: No abscess or rash  NEUROLOGIC:  Cranial nerves 2-12 grossly intact, no focal deficits  PSYCH: affect appropriate  Pelvic Exam: deferred to OR      LAB RESULTS:  Admission on 09/21/2021   Component Date Value Ref Range Status    HCG, Pregnancy Urine (POC) 09/21/2021 NEGATIVE  NEGATIVE Final    Comment: Specimens with hCG levels near the threshold of the test (25 mIU/mL) may give a negative or   indeterminate result. In such cases, another test should be performed with a new specimen   in 48-72 hours. If early pregnancy is suspected clinically in this setting, correlation   with quantitative serum b-hCG level is suggested.          TESTING PERFORMED AT  [facility] [address] [place], [address] [place] [address] Outpatient Visit on 09/14/2021   Component Date Value Ref Range Status    TSH 09/14/2021 1.42  0.30 - 5.00 mIU/L Final    WBC 09/14/2021 7.0  3.5 - 11.3 k/uL Final    RBC 09/14/2021 4.45  3.95 - 5.11 m/uL Final    Hemoglobin 09/14/2021 12.3  11.9 - 15.1 g/dL Final    Hematocrit 09/14/2021 38.5  36.3 - 47.1 % Final    MCV 09/14/2021 86.5  82.6 - 102.9 fL Final    MCH 09/14/2021 27.6  25.2 - 33.5 pg Final    MCHC 09/14/2021 31.9  28.4 - 34.8 g/dL Final    RDW 09/14/2021 13.5  11.8 - 14.4 % Final    Platelets 01/31/7619 253  138 - 453 k/uL Final    MPV 09/14/2021 11.5  8.1 - 13.5 fL Final    NRBC Automated 09/14/2021 0.0  0.0 per 100 WBC Final    Differential Type 09/14/2021 NOT REPORTED   Final    Seg Neutrophils 09/14/2021 54  36 - 65 % Final    Lymphocytes 09/14/2021 33  24 - 43 % Final    Monocytes 09/14/2021 7  3 - 12 % Final    Eosinophils % 09/14/2021 5* 1 - 4 % Final    Basophils 09/14/2021 1  0 - 2 % Final    Immature Granulocytes 09/14/2021 0  0 % Final    Segs Absolute 09/14/2021 3.75  1.50 - 8.10 k/uL Final    Absolute Lymph # 09/14/2021 2.30  1.10 - 3.70 k/uL Final    Absolute Mono # 09/14/2021 0.49  0.10 - 1.20 k/uL Final    Absolute Eos # 09/14/2021 0.37  0.00 - 0.44 k/uL Final    Basophils Absolute 09/14/2021 0.04  0.00 - 0.20 k/uL Final    Absolute Immature Granulocyte 09/14/2021 0.03  0.00 - 0.30 k/uL Final    WBC Morphology 09/14/2021 NOT REPORTED   Final    RBC Morphology 09/14/2021 NOT REPORTED   Final    Platelet Estimate 20/53/3638 NOT REPORTED   Final   Hospital Outpatient Visit on 09/14/2021 Component Date Value Ref Range Status    WBC 09/14/2021 7.0  3.5 - 11.3 k/uL Final    RBC 09/14/2021 4.45  3.95 - 5.11 m/uL Final    Hemoglobin 09/14/2021 12.3  11.9 - 15.1 g/dL Final    Hematocrit 09/14/2021 38.5  36.3 - 47.1 % Final    MCV 09/14/2021 86.5  82.6 - 102.9 fL Final    MCH 09/14/2021 27.6  25.2 - 33.5 pg Final    MCHC 09/14/2021 31.9  28.4 - 34.8 g/dL Final    RDW 09/14/2021 13.5  11.8 - 14.4 % Final    Platelets 84/34/5711 253  138 - 453 k/uL Final    MPV 09/14/2021 11.5  8.1 - 13.5 fL Final    NRBC Automated 09/14/2021 0.0  0.0 per 100 WBC Final    Differential Type 09/14/2021 NOT REPORTED   Final    WBC Morphology 09/14/2021 NOT REPORTED   Final    RBC Morphology 09/14/2021 NOT REPORTED   Final    Platelet Estimate 77/46/0022 NOT REPORTED   Final    Seg Neutrophils 09/14/2021 54  36 - 65 % Final    Lymphocytes 09/14/2021 33  24 - 43 % Final    Monocytes 09/14/2021 7  3 - 12 % Final    Eosinophils % 09/14/2021 5* 1 - 4 % Final    Basophils 09/14/2021 1  0 - 2 % Final    Immature Granulocytes 09/14/2021 0  0 % Final    Segs Absolute 09/14/2021 3.75  1.50 - 8.10 k/uL Final    Absolute Lymph # 09/14/2021 2.30  1.10 - 3.70 k/uL Final    Absolute Mono # 09/14/2021 0.49  0.10 - 1.20 k/uL Final    Absolute Eos # 09/14/2021 0.37  0.00 - 0.44 k/uL Final    Basophils Absolute 09/14/2021 0.04  0.00 - 0.20 k/uL Final    Absolute Immature Granulocyte 09/14/2021 0.03  0.00 - 0.30 k/uL Final    hCG Quant 09/14/2021 <1  <5 IU/L Final    Comment:    Non-preg premeno   <=5  Postmeno           <=8  Male               <=3  If HCG results do not concur with clinical observations, additional testing to confirm   results is recommended. Elevated results not associated with pregnancy may be found in patients with other diseases   such as tumors of the germ cells (testis, ovaries, etc.), bladder, pancreas, stomach, lungs,   and liver.       Glucose 09/14/2021 127* 70 - 99 mg/dL Final    BUN 09/14/2021 12  6 - 20 mg/dL Final    CREATININE 09/14/2021 0.75  0.50 - 0.90 mg/dL Final    Bun/Cre Ratio 09/14/2021 NOT REPORTED  9 - 20 Final    Calcium 09/14/2021 9.7  8.6 - 10.4 mg/dL Final    Sodium 09/14/2021 141  135 - 144 mmol/L Final    Potassium 09/14/2021 4.2  3.7 - 5.3 mmol/L Final    Chloride 09/14/2021 103  98 - 107 mmol/L Final    CO2 09/14/2021 20  20 - 31 mmol/L Final    Anion Gap 09/14/2021 18* 9 - 17 mmol/L Final    Alkaline Phosphatase 09/14/2021 100  35 - 104 U/L Final    ALT 09/14/2021 19  5 - 33 U/L Final    AST 09/14/2021 24  <32 U/L Final    Total Bilirubin 09/14/2021 0.42  0.3 - 1.2 mg/dL Final    Total Protein 09/14/2021 7.8  6.4 - 8.3 g/dL Final    Albumin 09/14/2021 4.6  3.5 - 5.2 g/dL Final    Albumin/Globulin Ratio 09/14/2021 1.4  1.0 - 2.5 Final    GFR Non- 09/14/2021 >60  >60 mL/min Final    GFR  09/14/2021 >60  >60 mL/min Final    GFR Comment 09/14/2021        Final    Comment: Average GFR for 38-51 years old:   80 mL/min/1.73sq m  Chronic Kidney Disease:   <60 mL/min/1.73sq m  Kidney failure:   <15 mL/min/1.73sq m              eGFR calculated using average adult body mass.  Additional eGFR calculator available at:        [email or web address]            GFR Staging 09/14/2021 NOT REPORTED   Final    Color, UA 09/14/2021 YELLOW  YELLOW Final    Turbidity UA 09/14/2021 CLEAR  CLEAR Final    Glucose, Ur 09/14/2021 NEGATIVE  NEGATIVE Final    Bilirubin Urine 09/14/2021 NEGATIVE  NEGATIVE Final    Ketones, Urine 09/14/2021 NEGATIVE  NEGATIVE Final    Specific Gravity, UA 09/14/2021 1.006  1.005 - 1.030 Final    Urine Hgb 09/14/2021 NEGATIVE  NEGATIVE Final    pH, UA 09/14/2021 5.5  5.0 - 8.0 Final    Protein, UA 09/14/2021 NEGATIVE  NEGATIVE Final    Urobilinogen, Urine 09/14/2021 Normal  Normal Final    Nitrite, Urine 09/14/2021 NEGATIVE  NEGATIVE Final    Leukocyte Esterase, Urine 09/14/2021

## 2021-09-21 ENCOUNTER — HOSPITAL ENCOUNTER (OUTPATIENT)
Age: 41
Setting detail: OUTPATIENT SURGERY
Discharge: HOME | End: 2021-09-21
Attending: OBSTETRICS & GYNECOLOGY | Admitting: OBSTETRICS & GYNECOLOGY
Payer: COMMERCIAL

## 2021-09-21 ENCOUNTER — ANESTHESIA (OUTPATIENT)
Dept: OPERATING ROOM | Age: 41
End: 2021-09-21
Payer: COMMERCIAL

## 2021-09-21 VITALS
WEIGHT: 201.13 LBS | TEMPERATURE: 97.3 F | OXYGEN SATURATION: 99 % | RESPIRATION RATE: 12 BRPM | BODY MASS INDEX: 28.79 KG/M2 | SYSTOLIC BLOOD PRESSURE: 140 MMHG | HEART RATE: 73 BPM | HEIGHT: 70 IN | DIASTOLIC BLOOD PRESSURE: 96 MMHG

## 2021-09-21 VITALS — DIASTOLIC BLOOD PRESSURE: 48 MMHG | OXYGEN SATURATION: 100 % | TEMPERATURE: 96.4 F | SYSTOLIC BLOOD PRESSURE: 85 MMHG

## 2021-09-21 DIAGNOSIS — G89.18 POST-OP PAIN: Primary | ICD-10-CM

## 2021-09-21 PROBLEM — Z98.890 HISTORY OF HYSTEROSCOPY: Status: ACTIVE | Noted: 2021-09-21

## 2021-09-21 LAB — HCG, PREGNANCY URINE (POC): NEGATIVE

## 2021-09-21 PROCEDURE — 3600000013 HC SURGERY LEVEL 3 ADDTL 15MIN: Performed by: OBSTETRICS & GYNECOLOGY

## 2021-09-21 PROCEDURE — 2720000010 HC SURG SUPPLY STERILE: Performed by: OBSTETRICS & GYNECOLOGY

## 2021-09-21 PROCEDURE — 2500000003 HC RX 250 WO HCPCS: Performed by: NURSE ANESTHETIST, CERTIFIED REGISTERED

## 2021-09-21 PROCEDURE — 7100000011 HC PHASE II RECOVERY - ADDTL 15 MIN: Performed by: OBSTETRICS & GYNECOLOGY

## 2021-09-21 PROCEDURE — 3600000003 HC SURGERY LEVEL 3 BASE: Performed by: OBSTETRICS & GYNECOLOGY

## 2021-09-21 PROCEDURE — 7100000010 HC PHASE II RECOVERY - FIRST 15 MIN: Performed by: OBSTETRICS & GYNECOLOGY

## 2021-09-21 PROCEDURE — 7100000000 HC PACU RECOVERY - FIRST 15 MIN: Performed by: OBSTETRICS & GYNECOLOGY

## 2021-09-21 PROCEDURE — 3700000000 HC ANESTHESIA ATTENDED CARE: Performed by: OBSTETRICS & GYNECOLOGY

## 2021-09-21 PROCEDURE — 2580000003 HC RX 258: Performed by: ANESTHESIOLOGY

## 2021-09-21 PROCEDURE — 6360000002 HC RX W HCPCS: Performed by: NURSE ANESTHETIST, CERTIFIED REGISTERED

## 2021-09-21 PROCEDURE — 81025 URINE PREGNANCY TEST: CPT

## 2021-09-21 PROCEDURE — 88305 TISSUE EXAM BY PATHOLOGIST: CPT

## 2021-09-21 PROCEDURE — 3700000001 HC ADD 15 MINUTES (ANESTHESIA): Performed by: OBSTETRICS & GYNECOLOGY

## 2021-09-21 PROCEDURE — 58561 HYSTEROSCOPY REMOVE MYOMA: CPT | Performed by: OBSTETRICS & GYNECOLOGY

## 2021-09-21 PROCEDURE — 2709999900 HC NON-CHARGEABLE SUPPLY: Performed by: OBSTETRICS & GYNECOLOGY

## 2021-09-21 PROCEDURE — 2580000003 HC RX 258: Performed by: OBSTETRICS & GYNECOLOGY

## 2021-09-21 RX ORDER — MORPHINE SULFATE 1 MG/ML
1 INJECTION, SOLUTION EPIDURAL; INTRATHECAL; INTRAVENOUS EVERY 5 MIN PRN
Status: DISCONTINUED | OUTPATIENT
Start: 2021-09-21 | End: 2021-09-21 | Stop reason: HOSPADM

## 2021-09-21 RX ORDER — MEPERIDINE HYDROCHLORIDE 50 MG/ML
12.5 INJECTION INTRAMUSCULAR; INTRAVENOUS; SUBCUTANEOUS EVERY 5 MIN PRN
Status: DISCONTINUED | OUTPATIENT
Start: 2021-09-21 | End: 2021-09-21 | Stop reason: HOSPADM

## 2021-09-21 RX ORDER — DIPHENHYDRAMINE HYDROCHLORIDE 50 MG/ML
12.5 INJECTION INTRAMUSCULAR; INTRAVENOUS
Status: DISCONTINUED | OUTPATIENT
Start: 2021-09-21 | End: 2021-09-21 | Stop reason: HOSPADM

## 2021-09-21 RX ORDER — SODIUM CHLORIDE 9 MG/ML
INJECTION, SOLUTION INTRAVENOUS CONTINUOUS
Status: DISCONTINUED | OUTPATIENT
Start: 2021-09-21 | End: 2021-09-21 | Stop reason: HOSPADM

## 2021-09-21 RX ORDER — HYDRALAZINE HYDROCHLORIDE 20 MG/ML
5 INJECTION INTRAMUSCULAR; INTRAVENOUS EVERY 10 MIN PRN
Status: DISCONTINUED | OUTPATIENT
Start: 2021-09-21 | End: 2021-09-21 | Stop reason: HOSPADM

## 2021-09-21 RX ORDER — IBUPROFEN 800 MG/1
800 TABLET ORAL EVERY 8 HOURS PRN
Qty: 60 TABLET | Refills: 0 | Status: SHIPPED | OUTPATIENT
Start: 2021-09-21 | End: 2021-10-26

## 2021-09-21 RX ORDER — ONDANSETRON 2 MG/ML
4 INJECTION INTRAMUSCULAR; INTRAVENOUS
Status: DISCONTINUED | OUTPATIENT
Start: 2021-09-21 | End: 2021-09-21 | Stop reason: HOSPADM

## 2021-09-21 RX ORDER — PROMETHAZINE HYDROCHLORIDE 25 MG/ML
6.25 INJECTION, SOLUTION INTRAMUSCULAR; INTRAVENOUS
Status: DISCONTINUED | OUTPATIENT
Start: 2021-09-21 | End: 2021-09-21 | Stop reason: HOSPADM

## 2021-09-21 RX ORDER — GLYCOPYRROLATE 1 MG/5 ML
SYRINGE (ML) INTRAVENOUS PRN
Status: DISCONTINUED | OUTPATIENT
Start: 2021-09-21 | End: 2021-09-21 | Stop reason: SDUPTHER

## 2021-09-21 RX ORDER — HYDROCODONE BITARTRATE AND ACETAMINOPHEN 5; 325 MG/1; MG/1
1 TABLET ORAL EVERY 4 HOURS PRN
Qty: 7 TABLET | Refills: 0 | Status: SHIPPED | OUTPATIENT
Start: 2021-09-21 | End: 2021-09-22

## 2021-09-21 RX ORDER — SODIUM CHLORIDE 9 MG/ML
25 INJECTION, SOLUTION INTRAVENOUS PRN
Status: DISCONTINUED | OUTPATIENT
Start: 2021-09-21 | End: 2021-09-21 | Stop reason: HOSPADM

## 2021-09-21 RX ORDER — SODIUM CHLORIDE 0.9 % (FLUSH) 0.9 %
10 SYRINGE (ML) INJECTION EVERY 12 HOURS SCHEDULED
Status: DISCONTINUED | OUTPATIENT
Start: 2021-09-21 | End: 2021-09-21 | Stop reason: HOSPADM

## 2021-09-21 RX ORDER — DEXAMETHASONE SODIUM PHOSPHATE 10 MG/ML
INJECTION, SOLUTION INTRAMUSCULAR; INTRAVENOUS PRN
Status: DISCONTINUED | OUTPATIENT
Start: 2021-09-21 | End: 2021-09-21 | Stop reason: SDUPTHER

## 2021-09-21 RX ORDER — SODIUM CHLORIDE, SODIUM LACTATE, POTASSIUM CHLORIDE, CALCIUM CHLORIDE 600; 310; 30; 20 MG/100ML; MG/100ML; MG/100ML; MG/100ML
INJECTION, SOLUTION INTRAVENOUS CONTINUOUS
Status: DISCONTINUED | OUTPATIENT
Start: 2021-09-21 | End: 2021-09-21 | Stop reason: HOSPADM

## 2021-09-21 RX ORDER — FENTANYL CITRATE 50 UG/ML
25 INJECTION, SOLUTION INTRAMUSCULAR; INTRAVENOUS EVERY 5 MIN PRN
Status: DISCONTINUED | OUTPATIENT
Start: 2021-09-21 | End: 2021-09-21 | Stop reason: HOSPADM

## 2021-09-21 RX ORDER — SODIUM CHLORIDE 0.9 % (FLUSH) 0.9 %
10 SYRINGE (ML) INJECTION PRN
Status: DISCONTINUED | OUTPATIENT
Start: 2021-09-21 | End: 2021-09-21 | Stop reason: HOSPADM

## 2021-09-21 RX ORDER — ONDANSETRON 2 MG/ML
INJECTION INTRAMUSCULAR; INTRAVENOUS PRN
Status: DISCONTINUED | OUTPATIENT
Start: 2021-09-21 | End: 2021-09-21 | Stop reason: SDUPTHER

## 2021-09-21 RX ORDER — HYDROCODONE BITARTRATE AND ACETAMINOPHEN 5; 325 MG/1; MG/1
2 TABLET ORAL PRN
Status: DISCONTINUED | OUTPATIENT
Start: 2021-09-21 | End: 2021-09-21 | Stop reason: HOSPADM

## 2021-09-21 RX ORDER — DOCUSATE SODIUM 100 MG/1
100 CAPSULE, LIQUID FILLED ORAL 2 TIMES DAILY
Qty: 60 CAPSULE | Refills: 0 | Status: SHIPPED | OUTPATIENT
Start: 2021-09-21 | End: 2021-10-21

## 2021-09-21 RX ORDER — MIDAZOLAM HYDROCHLORIDE 1 MG/ML
INJECTION INTRAMUSCULAR; INTRAVENOUS PRN
Status: DISCONTINUED | OUTPATIENT
Start: 2021-09-21 | End: 2021-09-21 | Stop reason: SDUPTHER

## 2021-09-21 RX ORDER — HYDROCODONE BITARTRATE AND ACETAMINOPHEN 5; 325 MG/1; MG/1
1 TABLET ORAL PRN
Status: DISCONTINUED | OUTPATIENT
Start: 2021-09-21 | End: 2021-09-21 | Stop reason: HOSPADM

## 2021-09-21 RX ORDER — MAGNESIUM HYDROXIDE 1200 MG/15ML
LIQUID ORAL CONTINUOUS PRN
Status: COMPLETED | OUTPATIENT
Start: 2021-09-21 | End: 2021-09-21

## 2021-09-21 RX ORDER — FENTANYL CITRATE 50 UG/ML
INJECTION, SOLUTION INTRAMUSCULAR; INTRAVENOUS PRN
Status: DISCONTINUED | OUTPATIENT
Start: 2021-09-21 | End: 2021-09-21 | Stop reason: SDUPTHER

## 2021-09-21 RX ORDER — PROPOFOL 10 MG/ML
INJECTION, EMULSION INTRAVENOUS PRN
Status: DISCONTINUED | OUTPATIENT
Start: 2021-09-21 | End: 2021-09-21 | Stop reason: SDUPTHER

## 2021-09-21 RX ORDER — KETOROLAC TROMETHAMINE 30 MG/ML
INJECTION, SOLUTION INTRAMUSCULAR; INTRAVENOUS PRN
Status: DISCONTINUED | OUTPATIENT
Start: 2021-09-21 | End: 2021-09-21 | Stop reason: SDUPTHER

## 2021-09-21 RX ORDER — LIDOCAINE HYDROCHLORIDE 10 MG/ML
INJECTION, SOLUTION EPIDURAL; INFILTRATION; INTRACAUDAL; PERINEURAL PRN
Status: DISCONTINUED | OUTPATIENT
Start: 2021-09-21 | End: 2021-09-21 | Stop reason: SDUPTHER

## 2021-09-21 RX ADMIN — MIDAZOLAM HYDROCHLORIDE 2 MG: 1 INJECTION, SOLUTION INTRAMUSCULAR; INTRAVENOUS at 09:04

## 2021-09-21 RX ADMIN — SODIUM CHLORIDE, POTASSIUM CHLORIDE, SODIUM LACTATE AND CALCIUM CHLORIDE: 600; 310; 30; 20 INJECTION, SOLUTION INTRAVENOUS at 09:16

## 2021-09-21 RX ADMIN — KETOROLAC TROMETHAMINE 30 MG: 30 INJECTION, SOLUTION INTRAMUSCULAR at 09:29

## 2021-09-21 RX ADMIN — ONDANSETRON 4 MG: 2 INJECTION, SOLUTION INTRAMUSCULAR; INTRAVENOUS at 09:19

## 2021-09-21 RX ADMIN — DEXAMETHASONE SODIUM PHOSPHATE 5 MG: 10 INJECTION, SOLUTION INTRAMUSCULAR; INTRAVENOUS at 09:13

## 2021-09-21 RX ADMIN — LIDOCAINE HYDROCHLORIDE 50 MG: 10 INJECTION, SOLUTION EPIDURAL; INFILTRATION; INTRACAUDAL; PERINEURAL at 09:07

## 2021-09-21 RX ADMIN — FENTANYL CITRATE 100 MCG: 50 INJECTION, SOLUTION INTRAMUSCULAR; INTRAVENOUS at 09:07

## 2021-09-21 RX ADMIN — SODIUM CHLORIDE, POTASSIUM CHLORIDE, SODIUM LACTATE AND CALCIUM CHLORIDE: 600; 310; 30; 20 INJECTION, SOLUTION INTRAVENOUS at 07:19

## 2021-09-21 RX ADMIN — PROPOFOL INJECTABLE EMULSION 200 MG: 10 INJECTION, EMULSION INTRAVENOUS at 09:07

## 2021-09-21 RX ADMIN — Medication 0.3 MG: at 09:16

## 2021-09-21 ASSESSMENT — PULMONARY FUNCTION TESTS
PIF_VALUE: 13
PIF_VALUE: 2
PIF_VALUE: 13
PIF_VALUE: 11
PIF_VALUE: 1
PIF_VALUE: 3
PIF_VALUE: 13
PIF_VALUE: 13
PIF_VALUE: 2
PIF_VALUE: 14
PIF_VALUE: 11
PIF_VALUE: 1
PIF_VALUE: 11
PIF_VALUE: 13
PIF_VALUE: 3
PIF_VALUE: 13
PIF_VALUE: 3
PIF_VALUE: 3
PIF_VALUE: 2
PIF_VALUE: 20
PIF_VALUE: 11
PIF_VALUE: 2
PIF_VALUE: 13
PIF_VALUE: 13
PIF_VALUE: 11
PIF_VALUE: 0
PIF_VALUE: 11
PIF_VALUE: 0
PIF_VALUE: 3
PIF_VALUE: 1
PIF_VALUE: 11

## 2021-09-21 ASSESSMENT — PAIN - FUNCTIONAL ASSESSMENT: PAIN_FUNCTIONAL_ASSESSMENT: 0-10

## 2021-09-21 NOTE — PROGRESS NOTES
CLINICAL PHARMACY NOTE: MEDS TO BEDS    Total # of Prescriptions Filled: 2   The following medications were delivered to the patient:  · norco 5  · ibupr 800    Additional Documentation:

## 2021-09-21 NOTE — ANESTHESIA PRE PROCEDURE
Department of Anesthesiology  Preprocedure Note       Name:  Ezequiel Sharp   Age:  42 y.o.  :  1980                                          MRN:  [id number]         Date:  2021      Surgeon: Little Valentin):  Tomasa Joya DO    Procedure: Procedure(s):  DILATATION AND CURETTAGE HYSTEROSCOPY MYOSURE    Medications prior to admission:   Prior to Admission medications    Medication Sig Start Date End Date Taking? Authorizing Provider   tiZANidine (ZANAFLEX) 2 MG tablet Take 1 tablet by mouth nightly as needed (back tightness) 21  Yes LENNY Martinez CNP   sertraline (ZOLOFT) 25 MG tablet Take 25 mg tablet daily. 21  Yes LENNY Diop CNP       Current medications:    Current Facility-Administered Medications   Medication Dose Route Frequency Provider Last Rate Last Admin    0.9 % sodium chloride infusion   IntraVENous Continuous Leila Borden MD        lactated ringers infusion   IntraVENous Continuous Leila Borden  mL/hr at 21 0719 New Bag at 21 0719    sodium chloride flush 0.9 % injection 10 mL  10 mL IntraVENous 2 times per day Leila Borden MD        sodium chloride flush 0.9 % injection 10 mL  10 mL IntraVENous PRN Leila Borden MD        0.9 % sodium chloride infusion  25 mL IntraVENous PRN Leila Borden MD           Allergies:  No Known Allergies    Problem List:    Patient Active Problem List   Diagnosis Code    Headache, chronic daily R51.9    Migraine headache with aura G43. 109    Fatigue R53.83    Dizziness R42    Neck stiffness M43.6    Menorrhagia with regular cycle N92.0    ASCUS with positive high risk HPV cervical R87.610, R87.810    Abnormal ultrasound R93.89    Intramural and submucous leiomyoma of uterus D25.1, D25.0       Past Medical History:        Diagnosis Date    Fatigue     Hemorrhoid        Past Surgical History:        Procedure Laterality Date    COLONOSCOPY  16    hemorrhoids    EYE SURGERY Bilateral     LISIK    HEMORRHOID SURGERY 10/14/2016    hemorrhoidectomy    LASIK Bilateral     TYMPANOSTOMY TUBE PLACEMENT      x 3 school age       Social History:    Social History     Tobacco Use    Smoking status: Never Smoker    Smokeless tobacco: Never Used   Substance Use Topics    Alcohol use: Yes     Comment: sociably/ not daily                                Counseling given: Not Answered      Vital Signs (Current):   Vitals:    09/21/21 0650 09/21/21 0707   BP:  (!) 133/97   Pulse:  74   Resp:  16   Temp:  98.1 °F (36.7 °C)   TempSrc:  Temporal   SpO2:  100%   Weight: 201 lb 2 oz (91.2 kg)    Height: 5' 10\" (1.778 m)                                               BP Readings from Last 3 Encounters:   09/21/21 (!) 133/97   08/31/21 (!) 134/94   08/31/21 120/64       NPO Status: Time of last liquid consumption: 2230                        Time of last solid consumption: 2230                        Date of last liquid consumption: 09/20/21                        Date of last solid food consumption: 09/20/21    BMI:   Wt Readings from Last 3 Encounters:   09/21/21 201 lb 2 oz (91.2 kg)   08/31/21 200 lb 3.2 oz (90.8 kg)   08/31/21 199 lb 6 oz (90.4 kg)     Body mass index is 28.86 kg/m². CBC:   Lab Results   Component Value Date    WBC 7.0 09/14/2021    RBC 4.45 09/14/2021    HGB 12.3 09/14/2021    HCT 38.5 09/14/2021    MCV 86.5 09/14/2021    RDW 13.5 09/14/2021     09/14/2021       CMP:   Lab Results   Component Value Date     09/14/2021    K 4.2 09/14/2021     09/14/2021    CO2 20 09/14/2021    BUN 12 09/14/2021    CREATININE 0.75 09/14/2021    GFRAA >60 09/14/2021    LABGLOM >60 09/14/2021    GLUCOSE 127 09/14/2021    PROT 7.8 09/14/2021    CALCIUM 9.7 09/14/2021    BILITOT 0.42 09/14/2021    ALKPHOS 100 09/14/2021    AST 24 09/14/2021    ALT 19 09/14/2021       POC Tests: No results for input(s): POCGLU, POCNA, POCK, POCCL, POCBUN, POCHEMO, POCHCT in the last 72 hours.     Coags: No results found for: PROTIME, INR, APTT    HCG (If Applicable):   Lab Results   Component Value Date    PREGTESTUR NEGA 12/15/2016    HCG NEGATIVE 09/21/2021    HCGQUANT <1 09/14/2021        ABGs: No results found for: PHART, PO2ART, MDP2AHT, KFL4TZB, BEART, X5TXSGLH     Type & Screen (If Applicable):  No results found for: LABABO, LABRH    Drug/Infectious Status (If Applicable):  No results found for: HIV, HEPCAB    COVID-19 Screening (If Applicable): No results found for: COVID19        Anesthesia Evaluation  Patient summary reviewed and Nursing notes reviewed no history of anesthetic complications:   Airway: Mallampati: II  TM distance: >3 FB   Neck ROM: full  Mouth opening: > = 3 FB Dental: normal exam         Pulmonary:Negative Pulmonary ROS and normal exam  breath sounds clear to auscultation                             Cardiovascular:Negative CV ROS            Rhythm: regular  Rate: normal                    Neuro/Psych:   (+) headaches: migraine headaches,             GI/Hepatic/Renal: Neg GI/Hepatic/Renal ROS            Endo/Other: Negative Endo/Other ROS                     ROS comment: HEAVY MENSTRAL BLEEDING, ABNORMAL ULTRASOUND Abdominal:       Abdomen: soft. Vascular: negative vascular ROS. Other Findings:           Anesthesia Plan      general     ASA 1       Induction: intravenous. MIPS: Prophylactic antiemetics administered. Anesthetic plan and risks discussed with patient. Plan discussed with CRNA.                   Pito Guzman MD   9/21/2021

## 2021-09-22 LAB — SURGICAL PATHOLOGY REPORT: NORMAL

## 2021-09-28 ENCOUNTER — OFFICE VISIT (OUTPATIENT)
Dept: PRIMARY CARE CLINIC | Age: 41
End: 2021-09-28
Payer: COMMERCIAL

## 2021-09-28 VITALS
DIASTOLIC BLOOD PRESSURE: 98 MMHG | OXYGEN SATURATION: 98 % | BODY MASS INDEX: 29.36 KG/M2 | WEIGHT: 204.6 LBS | SYSTOLIC BLOOD PRESSURE: 128 MMHG | HEART RATE: 88 BPM

## 2021-09-28 DIAGNOSIS — J30.2 SEASONAL ALLERGIES: ICD-10-CM

## 2021-09-28 DIAGNOSIS — I10 ESSENTIAL HYPERTENSION: Primary | ICD-10-CM

## 2021-09-28 PROCEDURE — 99213 OFFICE O/P EST LOW 20 MIN: CPT | Performed by: NURSE PRACTITIONER

## 2021-09-28 RX ORDER — LORATADINE 10 MG/1
10 TABLET ORAL DAILY
Qty: 30 TABLET | Refills: 3 | Status: SHIPPED | OUTPATIENT
Start: 2021-09-28 | End: 2021-10-26

## 2021-09-28 RX ORDER — LOSARTAN POTASSIUM 25 MG/1
25 TABLET ORAL DAILY
Qty: 30 TABLET | Refills: 3 | Status: SHIPPED | OUTPATIENT
Start: 2021-09-28 | End: 2021-10-26 | Stop reason: SINTOL

## 2021-09-28 RX ORDER — AZELASTINE 1 MG/ML
1 SPRAY, METERED NASAL 2 TIMES DAILY
Qty: 30 ML | Refills: 1 | Status: SHIPPED | OUTPATIENT
Start: 2021-09-28 | End: 2021-10-26

## 2021-09-28 ASSESSMENT — ENCOUNTER SYMPTOMS
CHEST TIGHTNESS: 0
SORE THROAT: 0
ABDOMINAL PAIN: 0
VOMITING: 0
SHORTNESS OF BREATH: 0
RHINORRHEA: 0
DIARRHEA: 0
NAUSEA: 0
COLOR CHANGE: 0

## 2021-09-28 NOTE — PATIENT INSTRUCTIONS
Patient Education        Elevated Blood Pressure: Care Instructions  Your Care Instructions    Blood pressure is a measure of how hard the blood pushes against the walls of your arteries. It's normal for blood pressure to go up and down throughout the day. But if it stays up over time, you have high blood pressure. Two numbers tell you your blood pressure. The first number is the systolic pressure. It shows how hard the blood pushes when your heart is pumping. The second number is the diastolic pressure. It shows how hard the blood pushes between heartbeats, when your heart is relaxed and filling with blood. An ideal blood pressure in adults is less than 120/80 (say \"120 over 80\"). High blood pressure is 140/90 or higher. You have high blood pressure if your top number is 140 or higher or your bottom number is 90 or higher, or both. The main test for high blood pressure is simple, fast, and painless. To diagnose high blood pressure, your doctor will test your blood pressure at different times. After testing your blood pressure, your doctor may ask you to test it again when you are home. If you are diagnosed with high blood pressure, you can work with your doctor to make a long-term plan to manage it. Follow-up care is a key part of your treatment and safety. Be sure to make and go to all appointments, and call your doctor if you are having problems. It's also a good idea to know your test results and keep a list of the medicines you take. How can you care for yourself at home? · Do not smoke. Smoking increases your risk for heart attack and stroke. If you need help quitting, talk to your doctor about stop-smoking programs and medicines. These can increase your chances of quitting for good. · Stay at a healthy weight. · Try to limit how much sodium you eat to less than 2,300 milligrams (mg) a day. Your doctor may ask you to try to eat less than 1,500 mg a day. · Be physically active.  Get at least 30 minutes of exercise on most days of the week. Walking is a good choice. You also may want to do other activities, such as running, swimming, cycling, or playing tennis or team sports. · Avoid or limit alcohol. Talk to your doctor about whether you can drink any alcohol. · Eat plenty of fruits, vegetables, and low-fat dairy products. Eat less saturated and total fats. · Learn how to check your blood pressure at home. When should you call for help? Call your doctor now or seek immediate medical care if:    · Your blood pressure is much higher than normal (such as 180/110 or higher).     · You think high blood pressure is causing symptoms such as:  ¨ Severe headache. ¨ Blurry vision.    Watch closely for changes in your health, and be sure to contact your doctor if:    · You do not get better as expected. Where can you learn more? Go to https://[employer] and sign in to your [employer] account. Enter K445 in the [employer] box to learn more about \"Elevated Blood Pressure: Care Instructions. \"     If you do not have an account, please click on the \"Sign Up Now\" link. Current as of: May 10, 2017  Content Version: 11.6  © 9907-6239 [employer]. Care instructions adapted under license by [facility] ([facility]). If you have questions about a medical condition or this instruction, always ask your healthcare professional. [address]  any warranty or liability for your use of this information.

## 2021-09-28 NOTE — PROGRESS NOTES
[address] PRIMARY CARE  [address] [place]  [address]  [address] [address]  Dept: [phone]  Dept Fax: [phone]    Jennifer Alston is a 42 y.o. female who presentstoday for her medical conditions/complaints as noted below.   Jennifer Alston is c/o of  Chief Complaint   Patient presents with    Hypertension     f/u    Allergies         HPI:     Here for recheck of HTN, BP remains elevated  Has home readings and log from hysteroscopy, generally 110s-120s/80s-90s  Denies chest pain, SOB, leg swelling, palpitations or HA    Has seasonal allergies, no relief with otc meds  Denies fever or chills, no cough, just has some nasal congestion and pressure in ears  Hx of tympanostomy tubes several times as child, generally has seasonal allergy symptoms like this        No results found for: LABA1C          ( goal A1C is < 7)   No results found for: LABMICR  LDL Cholesterol (mg/dL)   Date Value   2021 136 (H)   2019 129   2018 121       (goal LDL is <100)   AST (U/L)   Date Value   2021 24     ALT (U/L)   Date Value   2021 19     BUN (mg/dL)   Date Value   2021 12     BP Readings from Last 3 Encounters:   21 (!) 128/98   21 (!) 85/48   21 (!) 140/96          (ying320/80)    Past Medical History:   Diagnosis Date    Fatigue     Hemorrhoid       Past Surgical History:   Procedure Laterality Date    COLONOSCOPY  4-16    hemorrhoids    DILATION AND CURETTAGE OF UTERUS N/A 2021    HYSTEROSCOPY, DILATATION AND CURETTAGE WITH MYOSURE performed by Jeronimo Boles DO at [address]  10/14/2016    hemorrhoidectomy    HYSTEROSCOPY N/A 2021    HYSTEROSCOPY, DILATATION AND CURETTAGE WITH MYOSURE (N/A Vagina     LASIK Bilateral     TYMPANOSTOMY TUBE PLACEMENT      x 3 school age       Family History   Problem Relation Age of Onset    High Blood Pressure Father and fever. HENT: Negative for congestion, rhinorrhea and sore throat. Eyes: Negative for visual disturbance. Respiratory: Negative for chest tightness and shortness of breath. Cardiovascular: Negative for chest pain and palpitations. Gastrointestinal: Negative for abdominal pain, diarrhea, nausea and vomiting. Endocrine: Negative for polydipsia. Genitourinary: Negative for difficulty urinating. Musculoskeletal: Negative for arthralgias and myalgias. Skin: Negative for color change. Allergic/Immunologic: Positive for environmental allergies. Neurological: Negative for weakness and headaches. Psychiatric/Behavioral: Negative for behavioral problems. The patient is not nervous/anxious. All other systems reviewed and are negative. Objective:   BP (!) 128/98   Pulse 88   Wt 204 lb 9.6 oz (92.8 kg)   SpO2 98%   BMI 29.36 kg/m²   Physical Exam  Vitals reviewed. Constitutional:       General: She is not in acute distress. Appearance: Normal appearance. HENT:      Head: Normocephalic. Right Ear: Ear canal and external ear normal. A middle ear effusion (serous) is present. Tympanic membrane is scarred. Left Ear: Ear canal and external ear normal. A middle ear effusion (serous) is present. Tympanic membrane is scarred. Nose:      Right Sinus: No maxillary sinus tenderness or frontal sinus tenderness. Left Sinus: No maxillary sinus tenderness or frontal sinus tenderness. Mouth/Throat:      Pharynx: Oropharynx is clear. Eyes:      Pupils: Pupils are equal, round, and reactive to light. Cardiovascular:      Rate and Rhythm: Normal rate and regular rhythm. Pulses: Normal pulses. Heart sounds: Normal heart sounds. Pulmonary:      Effort: Pulmonary effort is normal.      Breath sounds: Normal breath sounds. Abdominal:      General: There is no distension. Musculoskeletal:         General: Normal range of motion. Right lower leg: No edema.

## 2021-10-08 ENCOUNTER — OFFICE VISIT (OUTPATIENT)
Dept: OBGYN CLINIC | Age: 41
End: 2021-10-08

## 2021-10-08 VITALS — DIASTOLIC BLOOD PRESSURE: 70 MMHG | SYSTOLIC BLOOD PRESSURE: 120 MMHG

## 2021-10-08 DIAGNOSIS — D25.1 INTRAMURAL AND SUBMUCOUS LEIOMYOMA OF UTERUS: ICD-10-CM

## 2021-10-08 DIAGNOSIS — D25.0 INTRAMURAL AND SUBMUCOUS LEIOMYOMA OF UTERUS: ICD-10-CM

## 2021-10-08 DIAGNOSIS — N93.9 ABNORMAL UTERINE BLEEDING (AUB): ICD-10-CM

## 2021-10-08 DIAGNOSIS — Z98.890 HISTORY OF HYSTEROSCOPY: Primary | ICD-10-CM

## 2021-10-08 DIAGNOSIS — N92.0 MENORRHAGIA WITH REGULAR CYCLE: ICD-10-CM

## 2021-10-08 PROCEDURE — 99024 POSTOP FOLLOW-UP VISIT: CPT | Performed by: OBSTETRICS & GYNECOLOGY

## 2021-10-08 NOTE — PROGRESS NOTES
Velton Peels  10/8/2021  5:48 PM      Velton Peels  Procedure:    Diagnosis Orders   1. HScope, Myosure 9/21/21     2. Menorrhagia with regular cycle     3. Intramural and submucous leiomyoma of uterus     4. Abnormal uterine bleeding (AUB)       Batsheva Cheney is a 42 y.o. female No obstetric history on file. The patient was seen, she denies any complaints. She denied any shortness of breath, chest pain or dizziness. She denied any nausea, vomiting, or diarrhea. There is no fever, chills, or rigors. The patient denies any vaginal bleeding, discharge or odor. All of her pre-operative complaints are now resolved. Blood pressure 120/70, last menstrual period 09/24/2021, not currently breastfeeding. Abdominal Exam: soft non-tender. Good bowel sounds. No guarding, rebound or rigidity. No costal vertebral angle tenderness bilateral. No hernias    Incision: no incision    Extremities: No edema or calf pain noted bilaterally. Pelvic Exam:Exam deferred. Results for orders placed or performed during the hospital encounter of 09/21/21   SURGICAL PATHOLOGY REPORT   Result Value Ref Range    Surgical Pathology Report       -- Diagnosis --  ENDOMETRIUM, CURETTINGS:  -BENIGN SECRETORY ENDOMETRIUM WITH ASSOCIATED PIECES OF BENIGN SMOOTH  MUSCLE. SEE COMMENT. -- Diagnosis Comment --  THE PIECES OF BENIGN SMOOTH MUSCLE MAY REPRESENT SUBMUCOSAL LEIOMYOMA. CLINICAL CORRELATION RECOMMENDED. Anat Bryant M.D  **Electronically Signed Out**         des/9/22/2021       Clinical Information  Pre-op Diagnosis:  HEAVY MENSTRUAL BLEEDING, ABNORMAL ULTRASOUND   Operative Findings:  Catheline Erics ENDOMETRIAL CURETTINGS  Operation Performed:  HYSTEROSCOPY, DILATATION AND CURETTAGE WITH  MYOSURE    Source of Specimen  1: MYOSURE ENDOMETRIAL CURETTINGS    Gross Description  \"ODESSA RICO, MYOSURE ENDOMETRIAL CURETTINGS\" Tan-white fragments,  4.5 x 2.5 x 0.3 cm in aggregate. Entirely 2cs.   tm Microscopic Description  Microscopic examination performed. SURGICAL PATHOLOGY CONSULTATION       Patient Name: Lei Owens: [id number]  Path Number: [id number]    [address] [place] [address]. [facility], [address]  [phone]  Fax: [phone]     POCT urine pregnancy   Result Value Ref Range    HCG, Pregnancy Urine (POC) NEGATIVE NEGATIVE           Assessment:      Diagnosis Orders   1. HScope, Myosure 9/21/21     2. Menorrhagia with regular cycle     3. Intramural and submucous leiomyoma of uterus     4. Abnormal uterine bleeding (AUB)          Patient Active Problem List    Diagnosis Date Noted    HScope, Myosure 9/21/21 09/21/2021    Abnormal uterine bleeding (AUB)     Intramural, submucous, and subserous leiomyoma of uterus     Abnormal ultrasound 06/14/2021    Intramural and submucous leiomyoma of uterus 06/14/2021    Menorrhagia with regular cycle 12/15/2016    ASCUS with positive high risk HPV cervical 12/15/2016    Headache, chronic daily 08/04/2014    Migraine headache with aura 08/04/2014    Fatigue 08/04/2014    Dizziness 08/04/2014    Neck stiffness 08/04/2014          POD# 3 weeks   Procedure: Hscope, D/C, Myosure myomectomy   Stable   Pathology reviewed and found to be benign.  Yes    Plan:   Return if symptoms worsen or fail to improve, for annual.  Restrictions lifted

## 2021-10-26 ENCOUNTER — OFFICE VISIT (OUTPATIENT)
Dept: PRIMARY CARE CLINIC | Age: 41
End: 2021-10-26
Payer: COMMERCIAL

## 2021-10-26 VITALS
SYSTOLIC BLOOD PRESSURE: 128 MMHG | HEART RATE: 77 BPM | RESPIRATION RATE: 16 BRPM | BODY MASS INDEX: 28.81 KG/M2 | DIASTOLIC BLOOD PRESSURE: 88 MMHG | WEIGHT: 200.8 LBS | OXYGEN SATURATION: 99 %

## 2021-10-26 DIAGNOSIS — I10 ESSENTIAL HYPERTENSION: Primary | ICD-10-CM

## 2021-10-26 PROCEDURE — 99213 OFFICE O/P EST LOW 20 MIN: CPT | Performed by: NURSE PRACTITIONER

## 2021-10-26 RX ORDER — LISINOPRIL 10 MG/1
10 TABLET ORAL DAILY
Qty: 30 TABLET | Refills: 3 | Status: SHIPPED | OUTPATIENT
Start: 2021-10-26 | End: 2022-02-01 | Stop reason: SINTOL

## 2021-10-26 ASSESSMENT — ENCOUNTER SYMPTOMS
SORE THROAT: 0
CHEST TIGHTNESS: 0
SHORTNESS OF BREATH: 0
NAUSEA: 0
ABDOMINAL PAIN: 0
COLOR CHANGE: 0
RHINORRHEA: 0
VOMITING: 0
DIARRHEA: 0

## 2021-10-26 ASSESSMENT — PATIENT HEALTH QUESTIONNAIRE - PHQ9
SUM OF ALL RESPONSES TO PHQ QUESTIONS 1-9: 0
1. LITTLE INTEREST OR PLEASURE IN DOING THINGS: 0
2. FEELING DOWN, DEPRESSED OR HOPELESS: 0
SUM OF ALL RESPONSES TO PHQ9 QUESTIONS 1 & 2: 0

## 2021-10-26 NOTE — PATIENT INSTRUCTIONS
Patient Education        lisinopril  Pronunciation:  ivette IN [facility]  Brand:  Prinivil, Qbrelis, Zestril  What is the most important information I should know about lisinopril? Do not use if you are pregnant, and tell your doctor right away if you become pregnant. If you have diabetes, do not use lisinopril together with any medication that contains aliskiren (a blood pressure medicine). Do not take lisinopril within 36 hours before or after taking medicine that contains sacubitril (such as Entresto). What is lisinopril? Lisinopril is an ACE inhibitor that is used to treat high blood pressure (hypertension) in adults and children who are at least 10years old. Lisinopril is also used to treat congestive heart failure in adults, or to improve survival after a heart attack. Lisinopril may also be used for purposes not listed in this medication guide. What should I discuss with my healthcare provider before taking lisinopril? You should not use lisinopril if you are allergic to it, or if you:  · have a history of angioedema;  · recently took a heart medicine called sacubitril; or  · are allergic to any other ACE inhibitor, such as benazepril, captopril, enalapril, fosinopril, moexipril, perindopril, quinapril, ramipril, or trandolapril. Do not take lisinopril within 36 hours before or after taking medicine that contains sacubitril (such as Entresto). If you have diabetes, do not use lisinopril together with any medication that contains aliskiren (a blood pressure medicine). You may also need to avoid taking lisinopril with aliskiren if you have kidney disease. Tell your doctor if you have ever had:  · kidney disease (or if you are on dialysis);  · liver disease; or  · high levels of potassium in your blood. Do not use if you are pregnant, and tell your doctor right away if you become pregnant.  Lisinopril can cause injury or death to the unborn baby if you take the medicine during your second or third trimester. You should not breastfeed while using this medicine. How should I take lisinopril? Follow all directions on your prescription label and read all medication guides or instruction sheets. Your doctor may occasionally change your dose. Use the medicine exactly as directed. Drink plenty of water each day while you are taking this medicine. Lisinopril can be taken with or without food. Measure liquid medicine carefully. Use the dosing syringe provided, or use a medicine dose-measuring device (not a kitchen spoon). Your blood pressure will need to be checked often. Your kidney function and electrolytes may also need to be checked. Call your doctor if you are sick with vomiting or diarrhea, or if you are sweating more than usual. You can easily become dehydrated while taking lisinopril. This can lead to very low blood pressure, a serious electrolyte imbalance, or kidney failure. If you need surgery, tell the surgeon ahead of time that you are using lisinopril. If you have high blood pressure, keep using this medicine even if you feel well. High blood pressure often has no symptoms. You may need to use blood pressure medicine for the rest of your life. Store at room temperature away from moisture and heat. Do not freeze the oral liquid. What happens if I miss a dose? Take the medicine as soon as you can, but skip the missed dose if it is almost time for your next dose. Do not take two doses at one time. What happens if I overdose? Seek emergency medical attention or call the Poison Help line at 1-253.376.1010. What should I avoid while taking lisinopril? Drinking alcohol can further lower your blood pressure and may increase certain side effects of lisinopril. Avoid becoming overheated or dehydrated during exercise, in hot weather, or by not drinking enough fluids. Lisinopril can decrease sweating and you may be more prone to heat stroke.   Do not use potassium supplements or salt substitutes, unless your doctor has told you to. Avoid getting up too fast from a sitting or lying position, or you may feel dizzy. What are the possible side effects of lisinopril? Get emergency medical help if you have signs of an allergic reaction: hives; severe stomach pain; difficulty breathing; swelling of your face, lips, tongue, or throat. You may be more likely to have an allergic reaction if you are -American. Call your doctor at once if you have:  · a light-headed feeling, like you might pass out;  · fever, sore throat;  · high potassium --nausea, weakness, tingly feeling, chest pain, irregular heartbeats, loss of movement;  · kidney problems --little or no urination, swelling in your feet or ankles, feeling tired or short of breath; or  · liver problems --nausea, upper stomach pain, itching, tired feeling, loss of appetite, dark urine, arianna-colored stools, jaundice (yellowing of the skin or eyes). Common side effects may include:  · headache, dizziness;  · cough; or  · chest pain. This is not a complete list of side effects and others may occur. Call your doctor for medical advice about side effects. You may report side effects to FDA at 0-119-FDA-4835. What other drugs will affect lisinopril? Tell your doctor about all your other medicines, especially:  · a diuretic or \"water pill\";  · lithium;  · gold injections to treat arthritis;  · insulin or oral diabetes medicine;  · a potassium supplement;  · medicine to prevent organ transplant rejection --everolimus, sirolimus, tacrolimus, temsirolimus; or  · NSAIDs (nonsteroidal anti-inflammatory drugs) --aspirin, ibuprofen (Advil, Motrin), naproxen (Aleve), celecoxib, diclofenac, indomethacin, meloxicam, and others. This list is not complete. Other drugs may affect lisinopril, including prescription and over-the-counter medicines, vitamins, and herbal products. Not all possible drug interactions are listed here. Where can I get more information?   Your professional. [address] any warranty or liability for your use of this information.

## 2021-10-26 NOTE — PROGRESS NOTES
[facility] CARE  [facility] [address]  [address] [address]  Dept: [phone]  Dept Fax: [phone]    Erik Avendaño is a 42 y.o. female who presentstoday for her medical conditions/complaints as noted below. Erik Avendaño is c/o of  Chief Complaint   Patient presents with    Hypertension     Discuss Medication         HPI:     Here for hypertension recheck, blood pressure normotensive today which is significantly improved from previous. Was started on losartan 25 mg daily and blood pressure had dropped significantly but then did increase to normal reading. She does report that she has been little more tired since starting this medication. Did try using a half tab of losartan to see if this would help with fatigue and it did minimally, blood pressure remained well controlled. We discussed alternatives and have decided to trial different medication. She denies chest pain, shortness of breath, leg swelling or palpitations. Has intermittent headache but attributes this to sinus pressure which is not uncommon for her.   She denies any other complaints or concerns      No results found for: LABA1C          ( goal A1C is < 7)   No results found for: LABMICR  LDL Cholesterol (mg/dL)   Date Value   08/03/2021 136 (H)   04/25/2019 129   03/28/2018 121       (goal LDL is <100)   AST (U/L)   Date Value   09/14/2021 24     ALT (U/L)   Date Value   09/14/2021 19     BUN (mg/dL)   Date Value   09/14/2021 12     BP Readings from Last 3 Encounters:   10/26/21 128/88   10/08/21 120/70   09/28/21 (!) 128/98          ([id number])    Past Medical History:   Diagnosis Date    Fatigue     Hemorrhoid       Past Surgical History:   Procedure Laterality Date    COLONOSCOPY  4-1-16    hemorrhoids    DILATION AND CURETTAGE OF UTERUS N/A 9/21/2021    HYSTEROSCOPY, DILATATION AND CURETTAGE WITH MYOSURE performed by Lesley Johnson DO at [address] [facility]. [address]     LISIK    HEMORRHOID SURGERY  10/14/2016    hemorrhoidectomy    HYSTEROSCOPY N/A 09/21/2021    HYSTEROSCOPY, DILATATION AND CURETTAGE WITH MYOSURE (N/A Vagina     LASIK Bilateral     TYMPANOSTOMY TUBE PLACEMENT      x 3 school age       Family History   Problem Relation Age of Onset    High Blood Pressure Father     Schizophrenia Father     OCD Father     Macular Degen Maternal Grandmother     Colon Cancer Maternal Grandfather     Heart Attack Maternal Grandfather     Stroke Paternal Grandmother     Breast Cancer Paternal Grandmother     Heart Attack Paternal Grandfather        Social History     Tobacco Use    Smoking status: Never Smoker    Smokeless tobacco: Never Used   Substance Use Topics    Alcohol use: Yes     Comment: sociably/ not daily      Current Outpatient Medications   Medication Sig Dispense Refill    lisinopril (PRINIVIL;ZESTRIL) 10 MG tablet Take 1 tablet by mouth daily 30 tablet 3    sertraline (ZOLOFT) 25 MG tablet Take 25 mg tablet daily. 30 tablet 3     No current facility-administered medications for this visit. No Known Allergies    Health Maintenance   Topic Date Due    Hepatitis C screen  Never done    HIV screen  Never done    DTaP/Tdap/Td vaccine (6 - Tdap) 12/15/2000    Diabetes screen  Never done    Flu vaccine (1) 10/26/2022 (Originally 9/1/2021)    Potassium monitoring  09/14/2022    Creatinine monitoring  09/14/2022    Cervical cancer screen  05/18/2026    Lipid screen  08/03/2026    COVID-19 Vaccine  Completed    Hepatitis A vaccine  Aged Out    Hepatitis B vaccine  Aged Out    Hib vaccine  Aged Out    Meningococcal (ACWY) vaccine  Aged Out    Pneumococcal 0-64 years Vaccine  Aged Out       Subjective:      Review of Systems   Constitutional: Negative for activity change, fatigue and fever. HENT: Negative for congestion, rhinorrhea and sore throat. Eyes: Negative for visual disturbance.    Respiratory: Negative for chest tightness and shortness of breath. Cardiovascular: Negative for chest pain and palpitations. Gastrointestinal: Negative for abdominal pain, diarrhea, nausea and vomiting. Endocrine: Negative for polydipsia. Genitourinary: Negative for difficulty urinating. Musculoskeletal: Negative for arthralgias and myalgias. Skin: Negative for color change. Neurological: Negative for weakness and headaches. Psychiatric/Behavioral: Negative for behavioral problems. The patient is not nervous/anxious. All other systems reviewed and are negative. Objective:   /88   Pulse 77   Resp 16   Wt 200 lb 12.8 oz (91.1 kg)   SpO2 99%   BMI 28.81 kg/m²   Physical Exam  Vitals reviewed. Constitutional:       General: She is not in acute distress. Appearance: Normal appearance. HENT:      Head: Normocephalic. Eyes:      Pupils: Pupils are equal, round, and reactive to light. Cardiovascular:      Rate and Rhythm: Normal rate and regular rhythm. Pulses: Normal pulses. Heart sounds: Normal heart sounds. Pulmonary:      Effort: Pulmonary effort is normal.      Breath sounds: Normal breath sounds. Abdominal:      General: There is no distension. Musculoskeletal:         General: Normal range of motion. Cervical back: Neck supple. Right lower leg: No edema. Left lower leg: No edema. Skin:     General: Skin is warm and dry. Capillary Refill: Capillary refill takes less than 2 seconds. Neurological:      General: No focal deficit present. Mental Status: She is alert and oriented to person, place, and time. Psychiatric:         Mood and Affect: Mood normal.         Behavior: Behavior normal.           :       Diagnosis Orders   1. Essential hypertension  lisinopril (PRINIVIL;ZESTRIL) 10 MG tablet             :          1. Essential hypertension  Blood pressure well controlled, will replace losartan with lisinopril 10 mg daily.   Continue checking blood pressure

## 2021-10-28 ENCOUNTER — HOSPITAL ENCOUNTER (OUTPATIENT)
Dept: MAMMOGRAPHY | Age: 41
Discharge: HOME | End: 2021-10-30
Payer: COMMERCIAL

## 2021-10-28 VITALS — HEIGHT: 70 IN | BODY MASS INDEX: 28.63 KG/M2 | WEIGHT: 200 LBS

## 2021-10-28 DIAGNOSIS — Z12.31 VISIT FOR SCREENING MAMMOGRAM: ICD-10-CM

## 2021-10-28 PROCEDURE — 77063 BREAST TOMOSYNTHESIS BI: CPT

## 2021-11-22 ENCOUNTER — TELEPHONE (OUTPATIENT)
Dept: PEDIATRIC NEUROLOGY | Age: 41
End: 2021-11-22

## 2021-11-22 RX ORDER — SERTRALINE HYDROCHLORIDE 25 MG/1
TABLET, FILM COATED ORAL
Qty: 30 TABLET | Refills: 3 | Status: SHIPPED | OUTPATIENT
Start: 2021-11-22 | End: 2021-11-30 | Stop reason: SDUPTHER

## 2021-11-30 RX ORDER — SERTRALINE HYDROCHLORIDE 25 MG/1
TABLET, FILM COATED ORAL
Qty: 30 TABLET | Refills: 3 | Status: SHIPPED | OUTPATIENT
Start: 2021-11-30 | End: 2022-02-01 | Stop reason: SDUPTHER

## 2021-12-07 ENCOUNTER — OFFICE VISIT (OUTPATIENT)
Dept: PRIMARY CARE CLINIC | Age: 41
End: 2021-12-07
Payer: COMMERCIAL

## 2021-12-07 VITALS
SYSTOLIC BLOOD PRESSURE: 128 MMHG | DIASTOLIC BLOOD PRESSURE: 70 MMHG | HEART RATE: 80 BPM | HEIGHT: 70 IN | RESPIRATION RATE: 16 BRPM | BODY MASS INDEX: 28.92 KG/M2 | WEIGHT: 202 LBS

## 2021-12-07 DIAGNOSIS — J45.990 EXERCISE-INDUCED ASTHMA: ICD-10-CM

## 2021-12-07 DIAGNOSIS — J30.2 SEASONAL ALLERGIES: ICD-10-CM

## 2021-12-07 DIAGNOSIS — I10 PRIMARY HYPERTENSION: Primary | ICD-10-CM

## 2021-12-07 DIAGNOSIS — R53.83 FATIGUE, UNSPECIFIED TYPE: Chronic | ICD-10-CM

## 2021-12-07 PROCEDURE — 99214 OFFICE O/P EST MOD 30 MIN: CPT | Performed by: NURSE PRACTITIONER

## 2021-12-07 RX ORDER — MONTELUKAST SODIUM 10 MG/1
10 TABLET ORAL NIGHTLY
Qty: 30 TABLET | Refills: 3 | Status: SHIPPED | OUTPATIENT
Start: 2021-12-07 | End: 2022-02-01

## 2021-12-07 ASSESSMENT — ENCOUNTER SYMPTOMS
DIARRHEA: 0
CHEST TIGHTNESS: 1
SHORTNESS OF BREATH: 0
SORE THROAT: 0
NAUSEA: 0
ABDOMINAL PAIN: 0
COLOR CHANGE: 0
VOMITING: 0
RHINORRHEA: 0

## 2021-12-07 NOTE — PROGRESS NOTES
HYSTEROSCOPY, DILATATION AND CURETTAGE WITH MYOSURE (N/A Vagina     LASIK Bilateral     TYMPANOSTOMY TUBE PLACEMENT      x 3 school age       Family History   Problem Relation Age of Onset    High Blood Pressure Father     Schizophrenia Father     OCD Father     Macular Degen Maternal Grandmother     Colon Cancer Maternal Grandfather     Heart Attack Maternal Grandfather     Stroke Paternal Grandmother     Breast Cancer Paternal Grandmother     Heart Attack Paternal Grandfather        Social History     Tobacco Use    Smoking status: Never Smoker    Smokeless tobacco: Never Used   Substance Use Topics    Alcohol use: Yes     Comment: sociably/ not daily      Current Outpatient Medications   Medication Sig Dispense Refill    montelukast (SINGULAIR) 10 MG tablet Take 1 tablet by mouth nightly 30 tablet 3    sertraline (ZOLOFT) 25 MG tablet Take 25 mg tablet daily. 30 tablet 3    lisinopril (PRINIVIL;ZESTRIL) 10 MG tablet Take 1 tablet by mouth daily 30 tablet 3     No current facility-administered medications for this visit. No Known Allergies    Health Maintenance   Topic Date Due    Pneumococcal 0-64 years Vaccine (1 of 2 - PPSV23) Never done    DTaP/Tdap/Td vaccine (6 - Tdap) 12/15/2000    Diabetes screen  Never done    COVID-19 Vaccine (2 - Booster for Janette series) 05/04/2021    Potassium monitoring  09/14/2022    Creatinine monitoring  09/14/2022    Cervical cancer screen  05/18/2026    Lipid screen  08/03/2026    Flu vaccine  Completed    Hepatitis A vaccine  Aged Out    Hepatitis B vaccine  Aged Out    Hib vaccine  Aged Out    Meningococcal (ACWY) vaccine  Aged Out    Hepatitis C screen  Discontinued    HIV screen  Discontinued       Subjective:      Review of Systems   Constitutional: Negative for activity change, fatigue and fever. HENT: Negative for congestion, rhinorrhea and sore throat. Eyes: Negative for visual disturbance.    Respiratory: Positive for chest montelukast (SINGULAIR) 10 MG tablet   4. Fatigue, unspecified type               :          1. Primary hypertension  Well controlled, ok to try lisinopril 5mg to see if this helps with side effects and maintains BP control     2. Seasonal allergies  3. Exercise-induced asthma  Waxing and waning, trial singulair 10mg nightly, continue antihistamine and nasal spray  - montelukast (SINGULAIR) 10 MG tablet; Take 1 tablet by mouth nightly  Dispense: 30 tablet; Refill: 3    4. Fatigue, unspecified type  Unchanged, correlates with timing of treating BP. She is motivated to exercise, improve lifestyle and lose weight in order to manage BP without meds to see if this helps. Thyroid normal      Return in about 4 months (around 4/7/2022) for Hypertension. Patient given educational materials - see patient instructions. Discussed use, benefit, and side effects of prescribed medications. All patient questions answered. Pt voiced understanding. Reviewed health maintenance. Instructed to continue current medications, diet and exercise. Patient agreed with treatment plan. Follow up as directed.        Electronicallysigned by LENNY Hansen CNP on 12/7/2021 at 11:45 AM

## 2022-01-17 ENCOUNTER — OFFICE VISIT (OUTPATIENT)
Dept: OBGYN CLINIC | Age: 42
End: 2022-01-17
Payer: COMMERCIAL

## 2022-01-17 ENCOUNTER — HOSPITAL ENCOUNTER (OUTPATIENT)
Age: 42
Setting detail: SPECIMEN
Discharge: HOME | End: 2022-01-17

## 2022-01-17 VITALS
DIASTOLIC BLOOD PRESSURE: 74 MMHG | WEIGHT: 202.13 LBS | SYSTOLIC BLOOD PRESSURE: 114 MMHG | BODY MASS INDEX: 29 KG/M2 | RESPIRATION RATE: 16 BRPM

## 2022-01-17 DIAGNOSIS — N89.8 VAGINAL DISCHARGE: ICD-10-CM

## 2022-01-17 DIAGNOSIS — N89.8 VAGINAL ODOR: ICD-10-CM

## 2022-01-17 DIAGNOSIS — N89.8 VAGINAL DISCHARGE: Primary | ICD-10-CM

## 2022-01-17 LAB
CANDIDA SPECIES, DNA PROBE: NEGATIVE
GARDNERELLA VAGINALIS, DNA PROBE: POSITIVE
SOURCE: ABNORMAL
TRICHOMONAS VAGINALIS DNA: NEGATIVE

## 2022-01-17 PROCEDURE — 99213 OFFICE O/P EST LOW 20 MIN: CPT | Performed by: OBSTETRICS & GYNECOLOGY

## 2022-01-17 NOTE — PROGRESS NOTES
Stevie Cornelius  2022    YOB: 1980    The patient was seen today. She is here regarding vaginal odor, discharge that is greyish, minimal. Her bowels are regular and she is voiding without difficulty. HPI:  Stevie Cornelius is a 42 y.o. female No obstetric history on file. Pt. States that she has had 1.5 of greyish discharge and an odor after condom came off during sex and was missing in vagina for approx 1-2 days per patient. Otherwise she is feeling well. No F/CH. She denies new partner, but is requesting vaginal cultures.       OB History    Para Term  AB Living   0 0 0 0 0 0   SAB IAB Ectopic Molar Multiple Live Births   0 0 0 0 0 0       Past Medical History:   Diagnosis Date    Fatigue     Hemorrhoid        Past Surgical History:   Procedure Laterality Date    COLONOSCOPY  16    hemorrhoids    DILATION AND CURETTAGE OF UTERUS N/A 2021    HYSTEROSCOPY, DILATATION AND CURETTAGE WITH MYOSURE performed by Clayton Lennox, DO at [address] [place], [place] [address]y. [address] B[address]     [facility] SURGERY  10/14/2016    hemorrhoidectomy    HYSTEROSCOPY N/A 2021    HYSTEROSCOPY, DILATATION AND CURETTAGE WITH MYOSURE (N/A Vagina     LASIK Bilateral     TYMPANOSTOMY TUBE PLACEMENT      x 3 school age       Family History   Problem Relation Age of Onset    High Blood Pressure Father     Schizophrenia Father     OCD Father     Macular Degen Maternal Grandmother     Colon Cancer Maternal Grandfather     Heart Attack Maternal Grandfather     Stroke Paternal Grandmother     Breast Cancer Paternal Grandmother     Heart Attack Paternal Grandfather        Social History     Socioeconomic History    Marital status: Single     Spouse name: Not on file    Number of children: Not on file    Years of education: Not on file    Highest education level: Not on file   Occupational History    Not on file   Tobacco Use    Smoking status: Never Smoker    Smokeless tobacco: Never Used   Vaping Use    Vaping Use: Never used   Substance and Sexual Activity    Alcohol use: Yes     Comment: sociably/ not daily    Drug use: No    Sexual activity: Not Currently   Other Topics Concern    Not on file   Social History Narrative    Not on file     Social Determinants of Health     Financial Resource Strain: Low Risk     Difficulty of Paying Living Expenses: Not very hard   Food Insecurity: No Food Insecurity    Worried About Running Out of Food in the Last Year: Never true    Huma of Food in the Last Year: Never true   Transportation Needs: No Transportation Needs    Lack of Transportation (Medical): No    Lack of Transportation (Non-Medical): No   Physical Activity:     Days of Exercise per Week: Not on file    Minutes of Exercise per Session: Not on file   Stress:     Feeling of Stress : Not on file   Social Connections:     Frequency of Communication with Friends and Family: Not on file    Frequency of Social Gatherings with Friends and Family: Not on file    Attends Bahai Services: Not on file    Active Member of [address] [place] [address] or Organizations: Not on file    Attends Club or Organization Meetings: Not on file    Marital Status: Not on file   Intimate Partner Violence:     Fear of Current or Ex-Partner: Not on file    Emotionally Abused: Not on file    Physically Abused: Not on file    Sexually Abused: Not on file   Housing Stability:     Unable to Pay for Housing in the Last Year: Not on file    Number of [place] in the Last Year: Not on file    Unstable Housing in the Last Year: Not on file         MEDICATIONS:  Current Outpatient Medications   Medication Sig Dispense Refill    montelukast (SINGULAIR) 10 MG tablet Take 1 tablet by mouth nightly 30 tablet 3    sertraline (ZOLOFT) 25 MG tablet Take 25 mg tablet daily.  30 tablet 3    lisinopril (PRINIVIL;ZESTRIL) 10 MG tablet Take 1 tablet by mouth daily 30 tablet 3     No current facility-administered medications for this visit. ALLERGIES:  Allergies as of 01/17/2022    (No Known Allergies)         REVIEW OF SYSTEMS:       A minimum of an eleven point review of systems was completed. Review Of Systems (11 point):  Constitutional: No fever, chills or malaise; No weight change or fatigue  Head and Eyes: No vision, Headache, Dizziness or trauma in last 12 months  ENT ROS: No hearing, Tinnitis, sinus or taste problems  Hematological and Lymphatic ROS:No Lymphoma, Von Willebrand's, Hemophillia or Bleeding History  Psych ROS: No Depression, Homicidal thoughts,suicidal thoughts, or anxiety  Breast ROS: No prior breast abnormalities or lumps  Respiratory ROS: No SOB, Pneumoniae,Cough, or Pulmonary Embolism History  Cardiovascular ROS: No Chest Pain with Exertion, Palpitations, Syncope, Edema, Arrhythmia  Gastrointestinal ROS: No Indigestion, Heartburn, Nausea, vomiting, Diarrhea, Constipation,or Bowel Changes; No Bloody Stools or melena  Genito-Urinary ROS: No Dysuria, Hematuria or Nocturia. No Urinary Incontinence +vaginal discharge +vaginal odor  Musculoskeletal ROS: No Arthralgia, Arthritis,Gout,Osteoporosis or Rheumatism  Neurological ROS: No CVA, Migraines, Epilepsy, Seizure Hx, or Limb Weakness  Dermatological ROS: No Rash, Itching, Hives, Mole Changes or Cancer          Blood pressure 114/74, resp. rate 16, weight 202 lb 2 oz (91.7 kg), not currently breastfeeding. Chaperone for Intimate Exam   Chaperone was offered and accepted as part of the rooming process.  Chaperone: Gordan Snellen         Abdomen: Soft non-tender; good bowel sounds. No guarding, rebound or rigidity. No CVA tenderness bilaterally. Extremities: No calf tenderness, DTR 2/4, and No edema bilaterally    Pelvic: Vulva and vagina appear normal. No dishcarge noted, no foreign body seen, no lesions or masses. Diagnostics:  No results found.     Lab Results:  Results for orders placed or performed during the hospital encounter of 09/21/21   SURGICAL PATHOLOGY REPORT   Result Value Ref Range    Surgical Pathology Report       -- Diagnosis --  ENDOMETRIUM, CURETTINGS:  -BENIGN SECRETORY ENDOMETRIUM WITH ASSOCIATED PIECES OF BENIGN SMOOTH  MUSCLE. SEE COMMENT. -- Diagnosis Comment --  THE PIECES OF BENIGN SMOOTH MUSCLE MAY REPRESENT SUBMUCOSAL LEIOMYOMA. CLINICAL CORRELATION RECOMMENDED. Mathew Boas, M.D  **Electronically Signed Out**         chandan/9/22/2021       Clinical Information  Pre-op Diagnosis:  HEAVY MENSTRUAL BLEEDING, ABNORMAL ULTRASOUND   Operative Findings:  [facility] ENDOMETRIAL CURETTINGS  Operation Performed:  HYSTEROSCOPY, DILATATION AND CURETTAGE WITH  MYOSURE    Source of Specimen  1: MYOSURE ENDOMETRIAL CURETTINGS    Gross Description  \"ODESSA TRISHA, MYOSURE ENDOMETRIAL CURETTINGS\" Tan-white fragments,  4.5 x 2.5 x 0.3 cm in aggregate. Entirely 2cs. tm       Microscopic Description  Microscopic examination performed. SURGICAL PATHOLOGY CONSULTATION       Patient Name: Amada Mckeonn: [id number]  Path Number: [id number]    [address] [place] [address]. [facility], [address]  [phone]  Fax: [phone]     POCT urine pregnancy   Result Value Ref Range    HCG, Pregnancy Urine (POC) NEGATIVE NEGATIVE         Assessment:   Diagnosis Orders   1. Vaginal discharge  VAGINITIS DNA PROBE    C.trachomatis N.gonorrhoeae DNA   2.  Vaginal odor  VAGINITIS DNA PROBE    C.trachomatis N.gonorrhoeae DNA     Patient Active Problem List    Diagnosis Date Noted    Primary hypertension 12/07/2021    Seasonal allergies 12/07/2021    Exercise-induced asthma 12/07/2021    HScope, Myosure 9/21/21 09/21/2021    Abnormal uterine bleeding (AUB)     Intramural, submucous, and subserous leiomyoma of uterus     Abnormal ultrasound 06/14/2021    Intramural and submucous leiomyoma of uterus 06/14/2021    Menorrhagia with regular cycle 12/15/2016    ASCUS with positive high risk HPV cervical 12/15/2016    Headache, chronic daily 08/04/2014    Migraine headache with aura 08/04/2014    Fatigue 08/04/2014    Neck stiffness 08/04/2014       PLAN:  Return if symptoms worsen or fail to improve, for annual.  Vaginal cultures pending  Recommend daily probiotic  Repeat Annual every 1 year  Cervical Cytology Evaluation begins at 24years old. If Negative Cytology, Follow-up screening per current guidelines. Counseled on preventative health maintenance follow-up. Orders Placed This Encounter   Procedures    VAGINITIS DNA PROBE     Standing Status:   Future     Standing Expiration Date:   1/17/2023    C.trachomatis N.gonorrhoeae DNA     Standing Status:   Future     Standing Expiration Date:   1/18/2023           The patient, Iker Silveira is a 42 y.o. female, was seen with a total time spent of 20 minutes for the visit on this date of service by the E/M provider. The time component had both face to face and non face to face time spent in determining the total time component. Counseling and education regarding her diagnosis listed below and her options regarding those diagnoses were also included in determining her time component. Diagnosis Orders   1. Vaginal discharge  VAGINITIS DNA PROBE    C.trachomatis N.gonorrhoeae DNA   2. Vaginal odor  VAGINITIS DNA PROBE    C.trachomatis N.gonorrhoeae DNA        The patient had her preventative health maintenance recommendations and follow-up reviewed with her at the completion of her visit.

## 2022-01-19 ENCOUNTER — TELEPHONE (OUTPATIENT)
Dept: OBGYN CLINIC | Age: 42
End: 2022-01-19

## 2022-01-19 LAB
C TRACH DNA GENITAL QL NAA+PROBE: NEGATIVE
N. GONORRHOEAE DNA: NEGATIVE
SPECIMEN DESCRIPTION: NORMAL

## 2022-01-21 RX ORDER — METRONIDAZOLE 500 MG/1
500 TABLET ORAL 2 TIMES DAILY
Qty: 14 TABLET | Refills: 0 | Status: SHIPPED | OUTPATIENT
Start: 2022-01-21 | End: 2022-01-28

## 2022-02-01 ENCOUNTER — OFFICE VISIT (OUTPATIENT)
Dept: PRIMARY CARE CLINIC | Age: 42
End: 2022-02-01
Payer: COMMERCIAL

## 2022-02-01 VITALS
HEART RATE: 81 BPM | RESPIRATION RATE: 17 BRPM | OXYGEN SATURATION: 95 % | BODY MASS INDEX: 28.7 KG/M2 | DIASTOLIC BLOOD PRESSURE: 90 MMHG | WEIGHT: 200 LBS | SYSTOLIC BLOOD PRESSURE: 140 MMHG

## 2022-02-01 DIAGNOSIS — R29.898 NECK TIGHTNESS: ICD-10-CM

## 2022-02-01 DIAGNOSIS — M53.80 BACK TIGHTNESS: ICD-10-CM

## 2022-02-01 DIAGNOSIS — I10 PRIMARY HYPERTENSION: Primary | ICD-10-CM

## 2022-02-01 PROCEDURE — 99214 OFFICE O/P EST MOD 30 MIN: CPT | Performed by: NURSE PRACTITIONER

## 2022-02-01 RX ORDER — GABAPENTIN 100 MG/1
100 CAPSULE ORAL NIGHTLY
Qty: 30 CAPSULE | Refills: 1 | Status: SHIPPED | OUTPATIENT
Start: 2022-02-01 | End: 2022-08-29

## 2022-02-01 RX ORDER — SERTRALINE HYDROCHLORIDE 25 MG/1
TABLET, FILM COATED ORAL
Qty: 90 TABLET | Refills: 1 | Status: SHIPPED | OUTPATIENT
Start: 2022-02-01 | End: 2022-08-19 | Stop reason: SDUPTHER

## 2022-02-01 RX ORDER — AMLODIPINE BESYLATE 2.5 MG/1
2.5 TABLET ORAL DAILY
Qty: 30 TABLET | Refills: 1 | Status: SHIPPED | OUTPATIENT
Start: 2022-02-01 | End: 2022-03-14 | Stop reason: SDUPTHER

## 2022-02-01 ASSESSMENT — PATIENT HEALTH QUESTIONNAIRE - PHQ9
1. LITTLE INTEREST OR PLEASURE IN DOING THINGS: 0
SUM OF ALL RESPONSES TO PHQ QUESTIONS 1-9: 0
SUM OF ALL RESPONSES TO PHQ9 QUESTIONS 1 & 2: 0
SUM OF ALL RESPONSES TO PHQ QUESTIONS 1-9: 0
2. FEELING DOWN, DEPRESSED OR HOPELESS: 0

## 2022-02-01 ASSESSMENT — ENCOUNTER SYMPTOMS
DIARRHEA: 0
SHORTNESS OF BREATH: 0
CHEST TIGHTNESS: 0
NAUSEA: 0
ABDOMINAL PAIN: 0
COLOR CHANGE: 0
RHINORRHEA: 0
SORE THROAT: 0
VOMITING: 0

## 2022-02-01 NOTE — PATIENT INSTRUCTIONS
Patient Education        Neck: Exercises  Introduction  Here are some examples of exercises for you to try. The exercises may be suggested for a condition or for rehabilitation. Start each exercise slowly. Ease off the exercises if you start to have pain. You will be told when to start these exercises and which ones will work best for you. How to do the exercises  Neck stretch    1. This stretch works best if you keep your shoulder down as you lean away from it. To help you remember to do this, start by relaxing your shoulders and lightly holding on to your thighs or your chair. 2. Tilt your head toward your shoulder and hold for 15 to 30 seconds. Let the weight of your head stretch your muscles. 3. If you would like a little added stretch, use your hand to gently and steadily pull your head toward your shoulder. For example, keeping your right shoulder down, lean your head to the left. 4. Repeat 2 to 4 times toward each shoulder. Diagonal neck stretch    1. Turn your head slightly toward the direction you will be stretching, and tilt your head diagonally toward your chest and hold for 15 to 30 seconds. 2. If you would like a little added stretch, use your hand to gently and steadily pull your head forward on the diagonal.  3. Repeat 2 to 4 times toward each side. Dorsal glide stretch    The dorsal glide stretches the back of the neck. If you feel pain, do not glide so far back. Some people find this exercise easier to do while lying on their backs with an ice pack on the neck. 1. Sit or stand tall and look straight ahead. 2. Slowly tuck your chin as you glide your head backward over your body  3. Hold for a count of 6, and then relax for up to 10 seconds. 4. Repeat 8 to 12 times. Chest and shoulder stretch    1. Sit or stand tall and glide your head backward as in the dorsal glide stretch. 2. Raise both arms so that your hands are next to your ears.   3. Take a deep breath, and as you breathe out, lower your elbows down and behind your back. You will feel your shoulder blades slide down and together, and at the same time you will feel a stretch across your chest and the front of your shoulders. 4. Hold for about 6 seconds, and then relax for up to 10 seconds. 5. Repeat 8 to 12 times. Strengthening: Hands on head    1. Move your head backward, forward, and side to side against gentle pressure from your hands, holding each position for about 6 seconds. 2. Repeat 8 to 12 times. Follow-up care is a key part of your treatment and safety. Be sure to make and go to all appointments, and call your doctor if you are having problems. It's also a good idea to know your test results and keep a list of the medicines you take. Where can you learn more? Go to https://WinLocalpeOONieb.WeHack.It. org and sign in to your [employer] account. Enter P975 in the [employer] box to learn more about \"Neck: Exercises. \"     If you do not have an account, please click on the \"Sign Up Now\" link. Current as of: July 1, 2021               Content Version: 13.1  © 2439-0595 Healthwise, Incorporated. Care instructions adapted under license by [facility] ([facility]). If you have questions about a medical condition or this instruction, always ask your healthcare professional. [address] any warranty or liability for your use of this information.

## 2022-02-03 ASSESSMENT — ENCOUNTER SYMPTOMS: BACK PAIN: 1

## 2022-03-13 ENCOUNTER — PATIENT MESSAGE (OUTPATIENT)
Dept: PRIMARY CARE CLINIC | Age: 42
End: 2022-03-13

## 2022-03-13 DIAGNOSIS — I10 PRIMARY HYPERTENSION: ICD-10-CM

## 2022-03-14 RX ORDER — AMLODIPINE BESYLATE 2.5 MG/1
2.5 TABLET ORAL DAILY
Qty: 90 TABLET | Refills: 1 | Status: SHIPPED | OUTPATIENT
Start: 2022-03-14 | End: 2022-08-19 | Stop reason: SDUPTHER

## 2022-03-14 NOTE — TELEPHONE ENCOUNTER
From: Stevie Cornelius  To: Rogelio Zambrano  Sent: 3/13/2022 6:04 PM EDT  Subject: Refill request     Could we please do a 90 day fill of the amlodipine besylate to [facility]? Thank you.

## 2022-07-18 DIAGNOSIS — R92.2 DENSE BREAST TISSUE ON MAMMOGRAM: ICD-10-CM

## 2022-07-18 DIAGNOSIS — Z80.3 FAMILY HISTORY OF BREAST CANCER IN FIRST DEGREE RELATIVE: ICD-10-CM

## 2022-07-18 DIAGNOSIS — Z91.89 AT HIGH RISK FOR BREAST CANCER: Primary | ICD-10-CM

## 2022-08-10 DIAGNOSIS — R92.2 DENSE BREAST TISSUE ON MAMMOGRAM: ICD-10-CM

## 2022-08-10 DIAGNOSIS — Z91.89 AT HIGH RISK FOR BREAST CANCER: Primary | ICD-10-CM

## 2022-08-10 DIAGNOSIS — Z80.3 FAMILY HISTORY OF BREAST CANCER IN FIRST DEGREE RELATIVE: ICD-10-CM

## 2022-08-19 DIAGNOSIS — I10 PRIMARY HYPERTENSION: ICD-10-CM

## 2022-08-19 RX ORDER — SERTRALINE HYDROCHLORIDE 25 MG/1
TABLET, FILM COATED ORAL
Qty: 90 TABLET | Refills: 1 | Status: SHIPPED | OUTPATIENT
Start: 2022-08-19

## 2022-08-19 RX ORDER — AMLODIPINE BESYLATE 2.5 MG/1
2.5 TABLET ORAL DAILY
Qty: 90 TABLET | Refills: 1 | Status: SHIPPED | OUTPATIENT
Start: 2022-08-19

## 2022-08-19 NOTE — TELEPHONE ENCOUNTER
----- Message from [address] sent at 8/18/2022  3:39 PM EDT -----  Subject: Refill Request    QUESTIONS  Name of Medication? sertraline (ZOLOFT) 25 MG tablet  Patient-reported dosage and instructions? 25 mg 1 X daily  How many days do you have left? 11  Preferred Pharmacy? [id number]  Pharmacy phone number (if available)? [phone]  ---------------------------------------------------------------------------  --------------,  Name of Medication? amLODIPine (NORVASC) 2.5 MG tablet  Patient-reported dosage and instructions? 2.5 mg 1 X daily  How many days do you have left? 5  Preferred Pharmacy? [id number]  Pharmacy phone number (if available)? [phone]  ---------------------------------------------------------------------------  --------------  CALL BACK INFO  What is the best way for the office to contact you? OK to leave message on   voicemail  Preferred Call Back Phone Number? [phone]  ---------------------------------------------------------------------------  --------------  SCRIPT ANSWERS  Relationship to Patient?  Self

## 2022-08-29 ENCOUNTER — OFFICE VISIT (OUTPATIENT)
Dept: PRIMARY CARE CLINIC | Age: 42
End: 2022-08-29
Payer: COMMERCIAL

## 2022-08-29 VITALS
SYSTOLIC BLOOD PRESSURE: 132 MMHG | WEIGHT: 207.8 LBS | BODY MASS INDEX: 29.82 KG/M2 | HEART RATE: 62 BPM | OXYGEN SATURATION: 99 % | RESPIRATION RATE: 16 BRPM | DIASTOLIC BLOOD PRESSURE: 88 MMHG

## 2022-08-29 DIAGNOSIS — G43.109 MIGRAINE WITH AURA AND WITHOUT STATUS MIGRAINOSUS, NOT INTRACTABLE: Chronic | ICD-10-CM

## 2022-08-29 DIAGNOSIS — F34.1 DYSTHYMIA: ICD-10-CM

## 2022-08-29 DIAGNOSIS — I10 PRIMARY HYPERTENSION: Primary | ICD-10-CM

## 2022-08-29 PROCEDURE — 99214 OFFICE O/P EST MOD 30 MIN: CPT | Performed by: NURSE PRACTITIONER

## 2022-08-29 RX ORDER — SUMATRIPTAN 25 MG/1
25 TABLET, FILM COATED ORAL
Qty: 12 TABLET | Refills: 3 | Status: SHIPPED | OUTPATIENT
Start: 2022-08-29 | End: 2022-08-29

## 2022-08-29 ASSESSMENT — ENCOUNTER SYMPTOMS
ABDOMINAL PAIN: 0
CHEST TIGHTNESS: 0
COLOR CHANGE: 0
NAUSEA: 0
RHINORRHEA: 0
VOMITING: 0
DIARRHEA: 0
SORE THROAT: 0

## 2022-08-29 ASSESSMENT — PATIENT HEALTH QUESTIONNAIRE - PHQ9
2. FEELING DOWN, DEPRESSED OR HOPELESS: 0
SUM OF ALL RESPONSES TO PHQ QUESTIONS 1-9: 0
SUM OF ALL RESPONSES TO PHQ QUESTIONS 1-9: 0
1. LITTLE INTEREST OR PLEASURE IN DOING THINGS: 0
SUM OF ALL RESPONSES TO PHQ QUESTIONS 1-9: 0
SUM OF ALL RESPONSES TO PHQ QUESTIONS 1-9: 0
SUM OF ALL RESPONSES TO PHQ9 QUESTIONS 1 & 2: 0

## 2022-08-29 NOTE — PROGRESS NOTES
[address] PRIMARY CARE  [address] [place]  [address] [address]  Dept: [phone]  Dept Fax: [phone]    Jeovany Huggins is a 36 y.o. female who presentstoday for her medical conditions/complaints as noted below. Jeovany Huggins is c/o of  Chief Complaint   Patient presents with    Hypertension     Med f/u         HPI:     Here for routine HTN f/u  BP well controlled with norvasc 2.5mg daily, BP averaging 120s/70s on home readings   Denies HA, CP, SOB or leg swelling     Dysthymia stable on zoloft, denies SI/HI. Rest and appetite are ok    Migraine stable with imitrex 25mg PRN  No other associated symptoms    Doing cycling class few days per week, enjoying it  Admits diet could be better, motivated to improve. Would like to lose some weight.    Has some fatigue, feels this is related to working out  Plans for Be Well screen next month           No results found for: LABA1C          ( goal A1C is < 7)   No results found for: LABMICR  LDL Cholesterol (mg/dL)   Date Value   2021 136 (H)   2019 129   2018 121       (goal LDL is <100)   AST (U/L)   Date Value   2021 24     ALT (U/L)   Date Value   2021 19     BUN (mg/dL)   Date Value   2021 12     BP Readings from Last 3 Encounters:   22 132/88   22 (!) 140/90   22 114/74          ([id number])    Past Medical History:   Diagnosis Date    Fatigue     Hemorrhoid       Past Surgical History:   Procedure Laterality Date    COLONOSCOPY  16    hemorrhoids    DILATION AND CURETTAGE OF UTERUS N/A 2021    HYSTEROSCOPY, DILATATION AND CURETTAGE WITH MYOSURE performed by Aisha Pandey DO at [facility]  10/14/2016    hemorrhoidectomy    HYSTEROSCOPY N/A 2021    HYSTEROSCOPY, DILATATION AND CURETTAGE WITH MYOSURE (N/A Vagina     LASIK Bilateral     TYMPANOSTOMY TUBE PLACEMENT      x 3 school age Family History   Problem Relation Age of Onset    High Blood Pressure Father     Schizophrenia Father     OCD Father     Macular Degen Maternal Grandmother     Colon Cancer Maternal Grandfather     Heart Attack Maternal Grandfather     Stroke Paternal Grandmother     Breast Cancer Paternal Grandmother     Heart Attack Paternal Grandfather        Social History     Tobacco Use    Smoking status: Never    Smokeless tobacco: Never   Substance Use Topics    Alcohol use: Yes     Comment: sociably/ not daily      Current Outpatient Medications   Medication Sig Dispense Refill    SUMAtriptan (IMITREX) 25 MG tablet Take 1 tablet by mouth once as needed for Migraine 12 tablet 3    sertraline (ZOLOFT) 25 MG tablet Take 25 mg tablet daily. 90 tablet 1    amLODIPine (NORVASC) 2.5 MG tablet Take 1 tablet by mouth daily 90 tablet 1     No current facility-administered medications for this visit. No Known Allergies    Health Maintenance   Topic Date Due    Pneumococcal 0-64 years Vaccine (1 - PCV) Never done    DTaP/Tdap/Td vaccine (6 - Tdap) 12/15/2000    Diabetes screen  Never done    COVID-19 Vaccine (2 - Booster for Janette series) 05/04/2021    Flu vaccine (1) 09/01/2022    Depression Screen  02/01/2023    Cervical cancer screen  05/18/2026    Lipids  08/03/2026    Hepatitis A vaccine  Aged Out    Hepatitis B vaccine  Aged Out    Hib vaccine  Aged Out    Meningococcal (ACWY) vaccine  Aged Out    Hepatitis C screen  Discontinued    HIV screen  Discontinued       Subjective:      Review of Systems   Constitutional:  Positive for fatigue. Negative for activity change and fever. HENT:  Negative for congestion, rhinorrhea and sore throat. Eyes:  Negative for visual disturbance. Respiratory:  Negative for chest tightness. Gastrointestinal:  Negative for abdominal pain, diarrhea, nausea and vomiting. Endocrine: Negative for polydipsia. Genitourinary:  Negative for difficulty urinating.    Musculoskeletal: Negative for arthralgias and myalgias. Skin:  Negative for color change. Neurological:  Negative for weakness. Psychiatric/Behavioral:  Negative for behavioral problems. The patient is not nervous/anxious. All other systems reviewed and are negative. Objective:   /88   Pulse 62   Resp 16   Wt 207 lb 12.8 oz (94.3 kg)   LMP  (LMP Unknown)   SpO2 99%   BMI 29.82 kg/m²   Physical Exam  Vitals reviewed. Constitutional:       General: She is not in acute distress. Appearance: Normal appearance. HENT:      Head: Normocephalic. Eyes:      Pupils: Pupils are equal, round, and reactive to light. Cardiovascular:      Rate and Rhythm: Normal rate and regular rhythm. Pulses: Normal pulses. Heart sounds: Normal heart sounds. Pulmonary:      Effort: Pulmonary effort is normal.      Breath sounds: Normal breath sounds. Abdominal:      General: There is no distension. Musculoskeletal:         General: Normal range of motion. Cervical back: Neck supple. Right lower leg: No edema. Left lower leg: No edema. Lymphadenopathy:      Cervical: No cervical adenopathy. Skin:     General: Skin is warm and dry. Capillary Refill: Capillary refill takes less than 2 seconds. Neurological:      General: No focal deficit present. Mental Status: She is alert and oriented to person, place, and time. Psychiatric:         Mood and Affect: Mood normal.         Behavior: Behavior normal.         :       Diagnosis Orders   1. Primary hypertension        2. Migraine with aura and without status migrainosus, not intractable  SUMAtriptan (IMITREX) 25 MG tablet      3. Dysthymia                  :          1. Primary hypertension  Stable, continue norvasc 2.5mg daily, low salt and exercise     2. Migraine with aura and without status migrainosus, not intractable  Stable, continue Imitrex PRN     - SUMAtriptan (IMITREX) 25 MG tablet;  Take 1 tablet by mouth once as needed for Migraine  Dispense: 12 tablet; Refill: 3    3. Dysthymia  Stable on zoloft     Offered additional labs including thyroid, pt declined. Fatigue is not new or worsening prom previous     Return in about 6 months (around 2/28/2023) for Hypertension. Patient given educational materials - see patient instructions. Discussed use, benefit, and side effects of prescribed medications. All patient questions answered. Pt voiced understanding. Reviewed health maintenance. Instructed to continue current medications, diet and exercise. Patient agreed with treatment plan. Follow up as directed.        Electronicallysigned by LENNY Maguire CNP on 8/29/2022 at 4:59 PM

## 2022-09-13 LAB
CHOLESTEROL/HDL RATIO: 3.7
CHOLESTEROL: 206 MG/DL
GLUCOSE BLD-MCNC: 81 MG/DL (ref 70–99)
HDLC SERPL-MCNC: 56 MG/DL
LDL CHOLESTEROL: 133 MG/DL (ref 0–130)
PATIENT FASTING?: YES
TRIGL SERPL-MCNC: 83 MG/DL

## 2022-09-26 ENCOUNTER — HOSPITAL ENCOUNTER (OUTPATIENT)
Dept: MRI IMAGING | Age: 42
Discharge: HOME | End: 2022-09-28
Payer: COMMERCIAL

## 2022-09-26 DIAGNOSIS — R92.2 DENSE BREAST TISSUE ON MAMMOGRAM: ICD-10-CM

## 2022-09-26 DIAGNOSIS — Z91.89 AT HIGH RISK FOR BREAST CANCER: ICD-10-CM

## 2022-09-26 DIAGNOSIS — Z80.3 FAMILY HISTORY OF BREAST CANCER IN FIRST DEGREE RELATIVE: ICD-10-CM

## 2022-09-26 LAB
BUN BLDV-MCNC: 12 MG/DL (ref 6–20)
CREAT SERPL-MCNC: 0.7 MG/DL (ref 0.5–0.9)
GFR AFRICAN AMERICAN: >60 ML/MIN
GFR NON-AFRICAN AMERICAN: >60 ML/MIN
GFR SERPL CREATININE-BSD FRML MDRD: NORMAL ML/MIN/{1.73_M2}

## 2022-09-26 PROCEDURE — 82565 ASSAY OF CREATININE: CPT

## 2022-09-26 PROCEDURE — C8908 MRI W/O FOL W/CONT, BREAST,: HCPCS

## 2022-09-26 PROCEDURE — 84520 ASSAY OF UREA NITROGEN: CPT

## 2022-09-26 PROCEDURE — A9579 GAD-BASE MR CONTRAST NOS,1ML: HCPCS | Performed by: OBSTETRICS & GYNECOLOGY

## 2022-09-26 PROCEDURE — 36415 COLL VENOUS BLD VENIPUNCTURE: CPT

## 2022-09-26 PROCEDURE — 6360000004 HC RX CONTRAST MEDICATION: Performed by: OBSTETRICS & GYNECOLOGY

## 2022-09-26 PROCEDURE — 2580000003 HC RX 258: Performed by: OBSTETRICS & GYNECOLOGY

## 2022-09-26 RX ORDER — SODIUM CHLORIDE 0.9 % (FLUSH) 0.9 %
10 SYRINGE (ML) INJECTION PRN
Status: DISCONTINUED | OUTPATIENT
Start: 2022-09-26 | End: 2022-09-29 | Stop reason: HOSPADM

## 2022-09-26 RX ORDER — 0.9 % SODIUM CHLORIDE 0.9 %
50 INTRAVENOUS SOLUTION INTRAVENOUS ONCE
Status: COMPLETED | OUTPATIENT
Start: 2022-09-26 | End: 2022-09-26

## 2022-09-26 RX ADMIN — GADOTERIDOL 19 ML: 279.3 INJECTION, SOLUTION INTRAVENOUS at 17:01

## 2022-09-26 RX ADMIN — SODIUM CHLORIDE 50 ML: 9 INJECTION, SOLUTION INTRAVENOUS at 17:01

## 2022-09-26 RX ADMIN — SODIUM CHLORIDE, PRESERVATIVE FREE 10 ML: 5 INJECTION INTRAVENOUS at 17:01

## 2022-11-09 ENCOUNTER — OFFICE VISIT (OUTPATIENT)
Dept: PRIMARY CARE CLINIC | Age: 42
End: 2022-11-09
Payer: COMMERCIAL

## 2022-11-09 VITALS
HEART RATE: 89 BPM | TEMPERATURE: 98.1 F | SYSTOLIC BLOOD PRESSURE: 122 MMHG | BODY MASS INDEX: 29.7 KG/M2 | OXYGEN SATURATION: 98 % | DIASTOLIC BLOOD PRESSURE: 82 MMHG | WEIGHT: 207 LBS

## 2022-11-09 DIAGNOSIS — B96.89 BACTERIAL SINUSITIS: Primary | ICD-10-CM

## 2022-11-09 DIAGNOSIS — J32.9 BACTERIAL SINUSITIS: Primary | ICD-10-CM

## 2022-11-09 PROCEDURE — 99213 OFFICE O/P EST LOW 20 MIN: CPT | Performed by: PHYSICIAN ASSISTANT

## 2022-11-09 PROCEDURE — 3074F SYST BP LT 130 MM HG: CPT | Performed by: PHYSICIAN ASSISTANT

## 2022-11-09 PROCEDURE — 3078F DIAST BP <80 MM HG: CPT | Performed by: PHYSICIAN ASSISTANT

## 2022-11-09 RX ORDER — PREDNISONE 20 MG/1
20 TABLET ORAL 2 TIMES DAILY
Qty: 10 TABLET | Refills: 0 | Status: SHIPPED | OUTPATIENT
Start: 2022-11-09 | End: 2022-11-14

## 2022-11-09 RX ORDER — FLUTICASONE PROPIONATE 50 MCG
2 SPRAY, SUSPENSION (ML) NASAL DAILY
Qty: 1 EACH | Refills: 0 | Status: SHIPPED | OUTPATIENT
Start: 2022-11-09

## 2022-11-09 RX ORDER — AMOXICILLIN 500 MG/1
500 CAPSULE ORAL 2 TIMES DAILY
Qty: 20 CAPSULE | Refills: 0 | Status: SHIPPED | OUTPATIENT
Start: 2022-11-09 | End: 2022-11-19

## 2022-11-09 ASSESSMENT — ENCOUNTER SYMPTOMS
SINUS PRESSURE: 1
HOARSE VOICE: 1

## 2022-11-09 NOTE — LETTER
[address]  [address] [place] [address]  Phone: [phone]  Fax: [phone]    Mirtha Jacinto        November 9, 2022     Patient: Yaneth Francis   YOB: 1980   Date of Visit: 11/9/2022       To Whom it May Concern:    Justyna Tee was seen in my clinic on 11/9/2022. She may return to work on 11/10/2022. If you have any questions or concerns, please don't hesitate to call.     Sincerely,         [facility]

## 2022-11-09 NOTE — PROGRESS NOTES
[place] [address] [facility]  Phone: [phone]  Fax: Corwin Adan    Pt Name: Veronica Sousa  MRN: [id number]  Chava 1980  Date of evaluation: 11/9/2022  Provider: Ho Valerio PA-C     CHIEF COMPLAINT       Chief Complaint   Patient presents with    Hoarse     Started Thurs-Friday last week. Got way worse yesterday. Nasal Congestion           HISTORY OF PRESENT ILLNESS  (Location/Symptom, Timing/Onset, Context/Setting, Quality, Duration, Modifying Factors, Severity.)   Veronica Sousa is a 36 y.o. Other [6] female who presents to the office for evaluation of      Sinusitis  This is a new problem. The current episode started in the past 7 days. The problem has been waxing and waning since onset. Associated symptoms include congestion, a hoarse voice and sinus pressure. Pertinent negatives include no ear pain. Nursing Notes were reviewed. REVIEW OF SYSTEMS    (2-9 systems for level 4, 10 or more for level 5)     Review of Systems   Constitutional:  Positive for fatigue. Negative for fever. HENT:  Positive for congestion, hoarse voice, postnasal drip and sinus pressure. Negative for ear discharge and ear pain. Respiratory: Negative. Except as noted above the remainder of the review of systems was reviewed andnegative. PAST MEDICAL HISTORY   History reviewed. Past Medical History:   Diagnosis Date    Fatigue     Hemorrhoid          SURGICAL HISTORY     History reviewed.     Past Surgical History:   Procedure Laterality Date    COLONOSCOPY  4-1-16    hemorrhoids    DILATION AND CURETTAGE OF UTERUS N/A 9/21/2021    HYSTEROSCOPY, DILATATION AND CURETTAGE WITH MYOSURE performed by Matt Mtz DO at [facility]  10/14/2016    hemorrhoidectomy    HYSTEROSCOPY N/A 09/21/2021    HYSTEROSCOPY, DILATATION AND CURETTAGE WITH MYOSURE (N/A Vagina     LASIK Bilateral     TYMPANOSTOMY TUBE PLACEMENT      x 3 school age         CURRENT MEDICATIONS       Current Outpatient Medications   Medication Sig Dispense Refill    amoxicillin (AMOXIL) 500 MG capsule Take 1 capsule by mouth 2 times daily for 10 days 20 capsule 0    predniSONE (DELTASONE) 20 MG tablet Take 1 tablet by mouth 2 times daily for 5 days 10 tablet 0    fluticasone (FLONASE) 50 MCG/ACT nasal spray 2 sprays by Nasal route daily 1 each 0    sertraline (ZOLOFT) 25 MG tablet Take 25 mg tablet daily. 90 tablet 1    amLODIPine (NORVASC) 2.5 MG tablet Take 1 tablet by mouth daily 90 tablet 1    SUMAtriptan (IMITREX) 25 MG tablet Take 1 tablet by mouth once as needed for Migraine 12 tablet 3     No current facility-administered medications for this visit. ALLERGIES     Patient has no known allergies. FAMILY HISTORY           Problem Relation Age of Onset    High Blood Pressure Father     Schizophrenia Father     OCD Father     Macular Degen Maternal Grandmother     Colon Cancer Maternal Grandfather     Heart Attack Maternal Grandfather     Stroke Paternal Grandmother     Breast Cancer Paternal Grandmother     Heart Attack Paternal Grandfather      Family Status   Relation Name Status    Father  (Not Specified)    MGM  (Not Specified)    MGF  (Not Specified)    PGM  (Not Specified)    PGF  (Not Specified)          SOCIAL HISTORY      reports that she has never smoked. She has never used smokeless tobacco. She reports current alcohol use. She reports that she does not use drugs. PHYSICAL EXAM    (up to 7 for level 4, 8 or more for level 5)     Vitals:    11/09/22 1241   BP: 122/82   Site: Left Upper Arm   Position: Sitting   Cuff Size: Medium Adult   Pulse: 89   Temp: 98.1 °F (36.7 °C)   SpO2: 98%   Weight: 207 lb (93.9 kg)         Physical Exam  Vitals and nursing note reviewed. Constitutional:       Appearance: Normal appearance. HENT:      Head: Normocephalic and atraumatic.       Right Ear: External ear normal.      Left Ear: External ear normal.      Nose: Congestion and rhinorrhea present. Right Sinus: Maxillary sinus tenderness and frontal sinus tenderness present. Left Sinus: Maxillary sinus tenderness and frontal sinus tenderness present. Mouth/Throat:      Mouth: Mucous membranes are moist.   Eyes:      Extraocular Movements: Extraocular movements intact. Conjunctiva/sclera: Conjunctivae normal.      Pupils: Pupils are equal, round, and reactive to light. Skin:     General: Skin is warm and dry. Neurological:      Mental Status: She is alert. DIFFERENTIAL DIAGNOSIS:     Ozzie Guardado reviewed the disposition diagnosis with the patient and or their family/guardian. I have answered their questions and given discharge instructions. They voiced understanding of these instructions and did not have anyfurther questions or complaints. PROCEDURES:  No orders of the defined types were placed in this encounter. No results found for this visit on 22. FINALIMPRESSION      Visit Diagnoses and Associated Orders       Bacterial sinusitis    -  Primary         ORDERS WITHOUT AN ASSOCIATED DIAGNOSIS    amoxicillin (AMOXIL) 500 MG capsule [451]      predniSONE (DELTASONE) 20 MG tablet [6496]      fluticasone (FLONASE) 50 MCG/ACT nasal spray [30343]                PLAN     Return if symptoms worsen or fail to improve.       DISCHARGEMEDICATIONS:  Orders Placed This Encounter   Medications    amoxicillin (AMOXIL) 500 MG capsule     Sig: Take 1 capsule by mouth 2 times daily for 10 days     Dispense:  20 capsule     Refill:  0    predniSONE (DELTASONE) 20 MG tablet     Sig: Take 1 tablet by mouth 2 times daily for 5 days     Dispense:  10 tablet     Refill:  0    fluticasone (FLONASE) 50 MCG/ACT nasal spray     Si sprays by Nasal route daily     Dispense:  1 each     Refill:  0         Plan:  Based on the duration and severity of the symptoms-- I will treat this as

## 2022-11-13 ASSESSMENT — ENCOUNTER SYMPTOMS: RESPIRATORY NEGATIVE: 1

## 2022-11-23 ENCOUNTER — OFFICE VISIT (OUTPATIENT)
Dept: PRIMARY CARE CLINIC | Age: 42
End: 2022-11-23
Payer: COMMERCIAL

## 2022-11-23 VITALS
OXYGEN SATURATION: 97 % | HEART RATE: 86 BPM | WEIGHT: 207 LBS | SYSTOLIC BLOOD PRESSURE: 118 MMHG | DIASTOLIC BLOOD PRESSURE: 64 MMHG | BODY MASS INDEX: 29.7 KG/M2 | TEMPERATURE: 99.1 F

## 2022-11-23 DIAGNOSIS — J32.9 BACTERIAL SINUSITIS: Primary | ICD-10-CM

## 2022-11-23 DIAGNOSIS — B96.89 BACTERIAL SINUSITIS: Primary | ICD-10-CM

## 2022-11-23 PROCEDURE — 3078F DIAST BP <80 MM HG: CPT | Performed by: FAMILY MEDICINE

## 2022-11-23 PROCEDURE — 99214 OFFICE O/P EST MOD 30 MIN: CPT | Performed by: FAMILY MEDICINE

## 2022-11-23 PROCEDURE — 3074F SYST BP LT 130 MM HG: CPT | Performed by: FAMILY MEDICINE

## 2022-11-23 RX ORDER — DOXYCYCLINE HYCLATE 100 MG
100 TABLET ORAL 2 TIMES DAILY
Qty: 20 TABLET | Refills: 0 | Status: SHIPPED | OUTPATIENT
Start: 2022-11-23 | End: 2022-12-03

## 2022-11-23 NOTE — PROGRESS NOTES
[facility]  [facility]   Chief Complaint   Patient presents with    Nasal Congestion     Was here a couple weeks ago and was treated with amox, and a steroid. She was better for maybe a week, but then her sx started back yesterday. Taking Nyquil for the sx. No fevers    Fluids are good    At hs nyquill helps    Energy level is still down a little    She never really went back to normal from the last time she was here    Patient Active Problem List   Diagnosis    Headache, chronic daily    Migraine headache with aura    Fatigue    Neck stiffness    Menorrhagia with regular cycle    ASCUS with positive high risk HPV cervical    Abnormal ultrasound    Intramural and submucous leiomyoma of uterus    HScope, Myosure 9/21/21    Abnormal uterine bleeding (AUB)    Intramural, submucous, and subserous leiomyoma of uterus    Primary hypertension    Seasonal allergies    Exercise-induced asthma         Objective:  Physical Exam   Vitals: Wt Readings from Last 3 Encounters:   11/09/22 207 lb (93.9 kg)   08/29/22 207 lb 12.8 oz (94.3 kg)   09/26/22 207 lb (93.9 kg)     Ht Readings from Last 3 Encounters:   09/26/22 5' 10\" (1.778 m)   12/07/21 5' 10\" (1.778 m)   10/28/21 5' 10\" (1.778 m)     Vitals:    11/23/22 0924   BP: 118/64   Site: Left Upper Arm   Position: Sitting   Cuff Size: Medium Adult   Pulse: 86   Temp: 99.1 °F (37.3 °C)   SpO2: 97%   Weight: 207 lb (93.9 kg)         Constitutional: She is oriented to person, place, and time. She appears well-developed and well-nourished and in no acute distress. Answers all my questions appropriately. Head: Normocephalic and atraumatic. Eyes:conjunctiva appear normal.    Right Ear: External ear normal. TM is clear  Left Ear: External ear normal. TM is clear    Nose: pink, non-edematous mucosa. No polyps. No septal deviation    Throat: no erythema, tonsillar hypertrophy or exudate. No ulcerations noted.   Lips/Teeth/Gums all appear normal.    Neck: Normal

## 2022-12-19 ENCOUNTER — PATIENT MESSAGE (OUTPATIENT)
Dept: PRIMARY CARE CLINIC | Age: 42
End: 2022-12-19

## 2022-12-19 NOTE — TELEPHONE ENCOUNTER
From: Khushboo Estrada  To: Zenaida Licea  Sent: 12/19/2022 11:43 AM EST  Subject: Pulmicort sinus rinse    I went to urgent care recently for two bouts of excessive sinus drainage. That has improved but still my typical drainage and lots of pressure. I know we have tried Singulair etc in the past wo improvement. My brother mentioned he uses pulmicort for sinus rinse with great results. Is this something I could try or would I have to wait to be seen again or contact urgent care?    Thanks  Malinda Lewis

## 2023-03-02 ENCOUNTER — OFFICE VISIT (OUTPATIENT)
Dept: PRIMARY CARE CLINIC | Age: 43
End: 2023-03-02
Payer: COMMERCIAL

## 2023-03-02 VITALS
HEART RATE: 73 BPM | BODY MASS INDEX: 30.42 KG/M2 | DIASTOLIC BLOOD PRESSURE: 86 MMHG | OXYGEN SATURATION: 98 % | WEIGHT: 212 LBS | RESPIRATION RATE: 15 BRPM | SYSTOLIC BLOOD PRESSURE: 124 MMHG

## 2023-03-02 DIAGNOSIS — I10 PRIMARY HYPERTENSION: Primary | ICD-10-CM

## 2023-03-02 DIAGNOSIS — J30.2 SEASONAL ALLERGIES: ICD-10-CM

## 2023-03-02 DIAGNOSIS — F34.1 DYSTHYMIA: ICD-10-CM

## 2023-03-02 DIAGNOSIS — M25.50 CHRONIC PAIN OF MULTIPLE JOINTS: ICD-10-CM

## 2023-03-02 DIAGNOSIS — G89.29 CHRONIC PAIN OF MULTIPLE JOINTS: ICD-10-CM

## 2023-03-02 PROCEDURE — 3079F DIAST BP 80-89 MM HG: CPT | Performed by: NURSE PRACTITIONER

## 2023-03-02 PROCEDURE — 3074F SYST BP LT 130 MM HG: CPT | Performed by: NURSE PRACTITIONER

## 2023-03-02 PROCEDURE — 99214 OFFICE O/P EST MOD 30 MIN: CPT | Performed by: NURSE PRACTITIONER

## 2023-03-02 RX ORDER — SERTRALINE HYDROCHLORIDE 25 MG/1
TABLET, FILM COATED ORAL
Qty: 90 TABLET | Refills: 1 | Status: SHIPPED | OUTPATIENT
Start: 2023-03-02

## 2023-03-02 RX ORDER — MELOXICAM 7.5 MG/1
7.5 TABLET ORAL DAILY
Qty: 90 TABLET | Refills: 1 | Status: SHIPPED | OUTPATIENT
Start: 2023-03-02

## 2023-03-02 RX ORDER — BUDESONIDE 0.25 MG/2ML
1 INHALANT ORAL 2 TIMES DAILY
Qty: 60 EACH | Refills: 3 | Status: SHIPPED | OUTPATIENT
Start: 2023-03-02

## 2023-03-02 RX ORDER — AMLODIPINE BESYLATE 2.5 MG/1
2.5 TABLET ORAL DAILY
Qty: 90 TABLET | Refills: 1 | Status: SHIPPED | OUTPATIENT
Start: 2023-03-02

## 2023-03-02 SDOH — ECONOMIC STABILITY: INCOME INSECURITY: HOW HARD IS IT FOR YOU TO PAY FOR THE VERY BASICS LIKE FOOD, HOUSING, MEDICAL CARE, AND HEATING?: NOT HARD AT ALL

## 2023-03-02 SDOH — ECONOMIC STABILITY: HOUSING INSECURITY
IN THE LAST 12 MONTHS, WAS THERE A TIME WHEN YOU DID NOT HAVE A STEADY PLACE TO SLEEP OR SLEPT IN A SHELTER (INCLUDING NOW)?: NO

## 2023-03-02 SDOH — ECONOMIC STABILITY: FOOD INSECURITY: WITHIN THE PAST 12 MONTHS, THE FOOD YOU BOUGHT JUST DIDN'T LAST AND YOU DIDN'T HAVE MONEY TO GET MORE.: NEVER TRUE

## 2023-03-02 SDOH — ECONOMIC STABILITY: FOOD INSECURITY: WITHIN THE PAST 12 MONTHS, YOU WORRIED THAT YOUR FOOD WOULD RUN OUT BEFORE YOU GOT MONEY TO BUY MORE.: NEVER TRUE

## 2023-03-02 ASSESSMENT — ENCOUNTER SYMPTOMS
DIARRHEA: 0
SORE THROAT: 0
COLOR CHANGE: 0
ABDOMINAL PAIN: 0
SHORTNESS OF BREATH: 0
NAUSEA: 0
CHEST TIGHTNESS: 0
RHINORRHEA: 0
VOMITING: 0

## 2023-03-02 ASSESSMENT — PATIENT HEALTH QUESTIONNAIRE - PHQ9
SUM OF ALL RESPONSES TO PHQ QUESTIONS 1-9: 0
SUM OF ALL RESPONSES TO PHQ QUESTIONS 1-9: 0
4. FEELING TIRED OR HAVING LITTLE ENERGY: 0
3. TROUBLE FALLING OR STAYING ASLEEP: 0
6. FEELING BAD ABOUT YOURSELF - OR THAT YOU ARE A FAILURE OR HAVE LET YOURSELF OR YOUR FAMILY DOWN: 0
7. TROUBLE CONCENTRATING ON THINGS, SUCH AS READING THE NEWSPAPER OR WATCHING TELEVISION: 0
8. MOVING OR SPEAKING SO SLOWLY THAT OTHER PEOPLE COULD HAVE NOTICED. OR THE OPPOSITE, BEING SO FIGETY OR RESTLESS THAT YOU HAVE BEEN MOVING AROUND A LOT MORE THAN USUAL: 0
SUM OF ALL RESPONSES TO PHQ9 QUESTIONS 1 & 2: 0
1. LITTLE INTEREST OR PLEASURE IN DOING THINGS: 0
SUM OF ALL RESPONSES TO PHQ QUESTIONS 1-9: 0
9. THOUGHTS THAT YOU WOULD BE BETTER OFF DEAD, OR OF HURTING YOURSELF: 0
5. POOR APPETITE OR OVEREATING: 0
10. IF YOU CHECKED OFF ANY PROBLEMS, HOW DIFFICULT HAVE THESE PROBLEMS MADE IT FOR YOU TO DO YOUR WORK, TAKE CARE OF THINGS AT HOME, OR GET ALONG WITH OTHER PEOPLE: 0
SUM OF ALL RESPONSES TO PHQ QUESTIONS 1-9: 0
2. FEELING DOWN, DEPRESSED OR HOPELESS: 0

## 2023-03-02 NOTE — PROGRESS NOTES
[facility]  [facility] [address] [facility] [address]  [address] [address]  Dept: [phone]  Dept Fax: [phone]    Ingris Cuevas is a 36 y.o. female who presentstoday for her medical conditions/complaints as noted below. Ingris Cuevas is c/o of  Chief Complaint   Patient presents with    Follow-up     Routine; HTN, Discuss Medication for Sinus Allergies, Joint Pain           HPI:     Here for routine HTN f/u  BP well controlled with norvasc 2.5mg daily, BP averaging 120s/70s on home readings   Denies HA, CP, SOB or leg swelling     Dysthymia stable on zoloft, denies SI/HI.  Rest and appetite are ok    Migraine stable with imitrex 25mg PRN  No other associated symptoms    Multiple arthralgias, would like to try mobic  Denies joint redness, warmth ro swelling  Often feels dull joint aches     C/o chronic sinus congestions, would like to try pulmicort sinus rinse  Has [facility] with instructions for sinus rinse BID with pulmicort 2ml in nasal saline and she would like to try  Her brother has had good relief with this which gave her the idea  Also willing to see ENT if no improvement  Has not had significant relief with antihistamine and saline rinse alone       No results found for: LABA1C          ( goal A1C is < 7)   No results found for: LABMICR  LDL Cholesterol (mg/dL)   Date Value   2022 133 (H)   2021 136 (H)   2019 129       (goal LDL is <100)   AST (U/L)   Date Value   2021 24     ALT (U/L)   Date Value   2021 19     BUN (mg/dL)   Date Value   2022 12     BP Readings from Last 3 Encounters:   23 124/86   22 118/64   22 122/82          ([id number])    Past Medical History:   Diagnosis Date    Fatigue     Hemorrhoid       Past Surgical History:   Procedure Laterality Date    COLONOSCOPY  16    hemorrhoids    DILATION AND CURETTAGE OF UTERUS N/A 2021    HYSTEROSCOPY, DILATATION AND CURETTAGE WITH MYOSURE performed by Corinne Coder,  at [facility] Bilateral     [place]  10/14/2016    hemorrhoidectomy    HYSTEROSCOPY N/A 09/21/2021    HYSTEROSCOPY, DILATATION AND CURETTAGE WITH MYOSURE (N/A Vagina     LASIK Bilateral     TYMPANOSTOMY TUBE PLACEMENT      x 3 school age       Family History   Problem Relation Age of Onset    High Blood Pressure Father     Schizophrenia Father     OCD Father     Macular Degen Maternal Grandmother     Colon Cancer Maternal Grandfather     Heart Attack Maternal Grandfather     Stroke Paternal Grandmother     Breast Cancer Paternal Grandmother     Heart Attack Paternal Grandfather        Social History     Tobacco Use    Smoking status: Never    Smokeless tobacco: Never   Substance Use Topics    Alcohol use: Yes     Comment: sociably/ not daily      Current Outpatient Medications   Medication Sig Dispense Refill    amLODIPine (NORVASC) 2.5 MG tablet Take 1 tablet by mouth daily 90 tablet 1    sertraline (ZOLOFT) 25 MG tablet Take 25 mg tablet daily. 90 tablet 1    budesonide (PULMICORT) 0.25 MG/2ML nebulizer suspension Take 2 mLs by nebulization in the morning and 2 mLs in the evening. 60 each 3    meloxicam (MOBIC) 7.5 MG tablet Take 1 tablet by mouth daily 90 tablet 1    fluticasone (FLONASE) 50 MCG/ACT nasal spray 2 sprays by Nasal route daily 1 each 0    SUMAtriptan (IMITREX) 25 MG tablet Take 1 tablet by mouth once as needed for Migraine 12 tablet 3     No current facility-administered medications for this visit.      No Known Allergies    Health Maintenance   Topic Date Due    Pneumococcal 0-64 years Vaccine (1 - PCV) Never done    DTaP/Tdap/Td vaccine (6 - Tdap) 12/15/2000    COVID-19 Vaccine (2 - Booster for Janette series) 05/04/2021    Depression Monitoring  08/29/2023    Cervical cancer screen  05/18/2026    Lipids  09/13/2027    Flu vaccine  Completed    Hepatitis A vaccine  Aged Out    Hib vaccine  Aged Out Meningococcal (ACWY) vaccine  Aged Out    Hepatitis C screen  Discontinued    HIV screen  Discontinued       Subjective:      Review of Systems   Constitutional:  Negative for activity change, fatigue and fever.   HENT:  Positive for congestion. Negative for rhinorrhea and sore throat.    Eyes:  Negative for visual disturbance.   Respiratory:  Negative for chest tightness and shortness of breath.    Cardiovascular:  Negative for chest pain and palpitations.   Gastrointestinal:  Negative for abdominal pain, diarrhea, nausea and vomiting.   Endocrine: Negative for polydipsia.   Genitourinary:  Negative for difficulty urinating.   Musculoskeletal:  Positive for arthralgias. Negative for myalgias.   Skin:  Negative for color change.   Neurological:  Negative for weakness and headaches.   Psychiatric/Behavioral:  Negative for behavioral problems. The patient is not nervous/anxious.    All other systems reviewed and are negative.    Objective:   /86   Pulse 73   Resp 15   Wt 212 lb (96.2 kg)   SpO2 98%   BMI 30.42 kg/m²   Physical Exam  Vitals reviewed.   Constitutional:       General: She is not in acute distress.     Appearance: Normal appearance.   HENT:      Head: Normocephalic.   Eyes:      Pupils: Pupils are equal, round, and reactive to light.   Cardiovascular:      Rate and Rhythm: Normal rate and regular rhythm.      Pulses: Normal pulses.      Heart sounds: Normal heart sounds.   Pulmonary:      Effort: Pulmonary effort is normal.      Breath sounds: Normal breath sounds.   Abdominal:      General: There is no distension.   Musculoskeletal:         General: Normal range of motion.      Cervical back: Neck supple.      Right lower leg: No edema.      Left lower leg: No edema.   Lymphadenopathy:      Cervical: No cervical adenopathy.   Skin:     General: Skin is warm and dry.      Capillary Refill: Capillary refill takes less than 2 seconds.   Neurological:      General: No focal deficit present.       Mental Status: She is alert and oriented to person, place, and time. Psychiatric:         Mood and Affect: Mood normal.         Behavior: Behavior normal.         :       Diagnosis Orders   1. Primary hypertension  amLODIPine (NORVASC) 2.5 MG tablet      2. Seasonal allergies  budesonide (PULMICORT) 0.25 MG/2ML nebulizer suspension      3. Dysthymia  sertraline (ZOLOFT) 25 MG tablet      4. Chronic pain of multiple joints  meloxicam (MOBIC) 7.5 MG tablet                :          1. Primary hypertension  Well controlled, continue norvasc 2.5mg daily, low salt and exercise   - amLODIPine (NORVASC) 2.5 MG tablet; Take 1 tablet by mouth daily  Dispense: 90 tablet; Refill: 1    2. Seasonal allergies  Uncontrolled, trial pulmicort sinus rinse. Will refer to ENT if no improvement   - budesonide (PULMICORT) 0.25 MG/2ML nebulizer suspension; Take 2 mLs by nebulization in the morning and 2 mLs in the evening. Dispense: 60 each; Refill: 3    3. Dysthymia  Stable with zoloft   - sertraline (ZOLOFT) 25 MG tablet; Take 25 mg tablet daily. Dispense: 90 tablet; Refill: 1    4. Chronic pain of multiple joints  Waxing and waning, trial mobic daily. May consider autoimmune workup if perists   - meloxicam (MOBIC) 7.5 MG tablet; Take 1 tablet by mouth daily  Dispense: 90 tablet; Refill: 1      Return in about 3 months (around 6/2/2023) for Hypertension, joint pain. Patient given educational materials - see patient instructions. Discussed use, benefit, and side effects of prescribed medications. All patient questions answered. Pt voiced understanding. Reviewed health maintenance. Instructed to continue current medications, diet and exercise. Patient agreed with treatment plan. Follow up as directed.        Electronicallysigned by LENNY Sherwood CNP on 3/2/2023 at 6:09 PM

## 2023-07-18 LAB
CHOLEST SERPL-MCNC: 189 MG/DL
CHOLESTEROL/HDL RATIO: 3.3
GLUCOSE SERPL-MCNC: 96 MG/DL (ref 70–99)
HDLC SERPL-MCNC: 57 MG/DL
LDLC SERPL CALC-MCNC: 111 MG/DL (ref 0–130)
PATIENT FASTING?: YES
TRIGL SERPL-MCNC: 106 MG/DL

## 2023-09-29 ENCOUNTER — OFFICE VISIT (OUTPATIENT)
Dept: PRIMARY CARE CLINIC | Age: 43
End: 2023-09-29
Payer: COMMERCIAL

## 2023-09-29 VITALS
RESPIRATION RATE: 15 BRPM | BODY MASS INDEX: 30.58 KG/M2 | DIASTOLIC BLOOD PRESSURE: 88 MMHG | SYSTOLIC BLOOD PRESSURE: 132 MMHG | WEIGHT: 213.6 LBS | HEIGHT: 70 IN | HEART RATE: 80 BPM | OXYGEN SATURATION: 99 %

## 2023-09-29 DIAGNOSIS — M25.50 ARTHRALGIA OF MULTIPLE SITES: ICD-10-CM

## 2023-09-29 DIAGNOSIS — F34.1 DYSTHYMIA: Primary | ICD-10-CM

## 2023-09-29 DIAGNOSIS — G25.81 RLS (RESTLESS LEGS SYNDROME): ICD-10-CM

## 2023-09-29 DIAGNOSIS — G43.109 MIGRAINE WITH AURA AND WITHOUT STATUS MIGRAINOSUS, NOT INTRACTABLE: Chronic | ICD-10-CM

## 2023-09-29 PROCEDURE — 3079F DIAST BP 80-89 MM HG: CPT | Performed by: NURSE PRACTITIONER

## 2023-09-29 PROCEDURE — 99214 OFFICE O/P EST MOD 30 MIN: CPT | Performed by: NURSE PRACTITIONER

## 2023-09-29 PROCEDURE — 3075F SYST BP GE 130 - 139MM HG: CPT | Performed by: NURSE PRACTITIONER

## 2023-09-29 RX ORDER — ROPINIROLE 0.25 MG/1
0.25 TABLET, FILM COATED ORAL 3 TIMES DAILY
Qty: 90 TABLET | Refills: 1 | Status: SHIPPED | OUTPATIENT
Start: 2023-09-29

## 2023-09-29 RX ORDER — SUMATRIPTAN 25 MG/1
25 TABLET, FILM COATED ORAL
Qty: 12 TABLET | Refills: 3 | Status: SHIPPED | OUTPATIENT
Start: 2023-09-29 | End: 2023-09-29

## 2023-09-29 RX ORDER — SERTRALINE HYDROCHLORIDE 25 MG/1
TABLET, FILM COATED ORAL
Qty: 90 TABLET | Refills: 1 | Status: SHIPPED | OUTPATIENT
Start: 2023-09-29

## 2023-09-29 ASSESSMENT — ENCOUNTER SYMPTOMS
DIARRHEA: 0
ABDOMINAL PAIN: 0
SORE THROAT: 0
VOMITING: 0
SHORTNESS OF BREATH: 0
COLOR CHANGE: 0
CHEST TIGHTNESS: 0
NAUSEA: 0
RHINORRHEA: 0

## 2023-09-29 ASSESSMENT — PATIENT HEALTH QUESTIONNAIRE - PHQ9
7. TROUBLE CONCENTRATING ON THINGS, SUCH AS READING THE NEWSPAPER OR WATCHING TELEVISION: 0
4. FEELING TIRED OR HAVING LITTLE ENERGY: 0
SUM OF ALL RESPONSES TO PHQ9 QUESTIONS 1 & 2: 0
SUM OF ALL RESPONSES TO PHQ QUESTIONS 1-9: 0
5. POOR APPETITE OR OVEREATING: 0
SUM OF ALL RESPONSES TO PHQ QUESTIONS 1-9: 0
10. IF YOU CHECKED OFF ANY PROBLEMS, HOW DIFFICULT HAVE THESE PROBLEMS MADE IT FOR YOU TO DO YOUR WORK, TAKE CARE OF THINGS AT HOME, OR GET ALONG WITH OTHER PEOPLE: 0
2. FEELING DOWN, DEPRESSED OR HOPELESS: 0
SUM OF ALL RESPONSES TO PHQ QUESTIONS 1-9: 0
1. LITTLE INTEREST OR PLEASURE IN DOING THINGS: 0
6. FEELING BAD ABOUT YOURSELF - OR THAT YOU ARE A FAILURE OR HAVE LET YOURSELF OR YOUR FAMILY DOWN: 0
SUM OF ALL RESPONSES TO PHQ QUESTIONS 1-9: 0
3. TROUBLE FALLING OR STAYING ASLEEP: 0
9. THOUGHTS THAT YOU WOULD BE BETTER OFF DEAD, OR OF HURTING YOURSELF: 0
8. MOVING OR SPEAKING SO SLOWLY THAT OTHER PEOPLE COULD HAVE NOTICED. OR THE OPPOSITE, BEING SO FIGETY OR RESTLESS THAT YOU HAVE BEEN MOVING AROUND A LOT MORE THAN USUAL: 0

## 2023-10-02 DIAGNOSIS — G43.109 MIGRAINE WITH AURA AND WITHOUT STATUS MIGRAINOSUS, NOT INTRACTABLE: Chronic | ICD-10-CM

## 2023-10-02 RX ORDER — SUMATRIPTAN 25 MG/1
25 TABLET, FILM COATED ORAL DAILY PRN
Qty: 54 TABLET | Refills: 1 | Status: SHIPPED | OUTPATIENT
Start: 2023-10-02

## 2023-10-02 NOTE — TELEPHONE ENCOUNTER
[address] Delivery called regarding pts sumatriptan. They need a new script sent in as a 90 day supply (for insurance) which is 54 tablets. They also need a max daily dose to be added to medication sig.

## 2023-10-27 ENCOUNTER — PATIENT MESSAGE (OUTPATIENT)
Dept: PRIMARY CARE CLINIC | Age: 43
End: 2023-10-27

## 2023-10-27 DIAGNOSIS — M25.50 ARTHRALGIA OF MULTIPLE SITES: Primary | ICD-10-CM

## 2023-10-27 NOTE — TELEPHONE ENCOUNTER
From: Anuj Garcia  To: Salazar Pang  Sent: 10/27/2023 9:39 AM EDT  Subject: Muscle pain     Good morning,   I was wondering if you would be willing to write a referral for a PT evaluation to Cathy Toussaint PT at [facility] in [place]. He comes highly recommended and I think it may be worthwhile to see if PT may have recommendations in relation to the muscle pain and stiffness. Thank you!    Luz Foley

## 2023-12-14 ENCOUNTER — HOSPITAL ENCOUNTER (OUTPATIENT)
Dept: MAMMOGRAPHY | Age: 43
Discharge: HOME | End: 2023-12-16
Payer: COMMERCIAL

## 2023-12-14 VITALS — WEIGHT: 214 LBS | BODY MASS INDEX: 30.64 KG/M2 | HEIGHT: 70 IN

## 2023-12-14 DIAGNOSIS — Z12.31 ENCOUNTER FOR SCREENING MAMMOGRAM FOR MALIGNANT NEOPLASM OF BREAST: ICD-10-CM

## 2023-12-14 PROCEDURE — 77063 BREAST TOMOSYNTHESIS BI: CPT

## 2024-03-12 DIAGNOSIS — F34.1 DYSTHYMIA: ICD-10-CM

## 2024-03-12 RX ORDER — SERTRALINE HYDROCHLORIDE 25 MG/1
TABLET, FILM COATED ORAL
Qty: 90 TABLET | Refills: 1 | Status: SHIPPED | OUTPATIENT
Start: 2024-03-12

## 2024-07-24 ENCOUNTER — TELEMEDICINE (OUTPATIENT)
Dept: PRIMARY CARE CLINIC | Age: 44
End: 2024-07-24
Payer: COMMERCIAL

## 2024-07-24 DIAGNOSIS — J30.89 ENVIRONMENTAL AND SEASONAL ALLERGIES: ICD-10-CM

## 2024-07-24 DIAGNOSIS — R53.83 FATIGUE, UNSPECIFIED TYPE: Primary | ICD-10-CM

## 2024-07-24 DIAGNOSIS — R76.8 POSITIVE ANA (ANTINUCLEAR ANTIBODY): ICD-10-CM

## 2024-07-24 PROCEDURE — 99214 OFFICE O/P EST MOD 30 MIN: CPT | Performed by: NURSE PRACTITIONER

## 2024-07-24 ASSESSMENT — ENCOUNTER SYMPTOMS
NAUSEA: 0
RHINORRHEA: 0
CHEST TIGHTNESS: 0
DIARRHEA: 0
ABDOMINAL PAIN: 0
SORE THROAT: 0
VOMITING: 0
SINUS PRESSURE: 1
COLOR CHANGE: 0
SHORTNESS OF BREATH: 0

## 2024-07-24 NOTE — PROGRESS NOTES
exam to video visit)          [] No gaze palsy        [] Abnormal-         Skin:        [x] No significant exanthematous lesions or discoloration noted on facial skin         [] Abnormal-            Psychiatric:       [x] Normal Affect [] No Hallucinations        [] Abnormal-     Other pertinent observable physical exam findings-     ASSESSMENT/PLAN:  1. Fatigue, unspecified type  New, check thyroid, CMP, CBC.   - TSH With Reflex Ft4; Future  - Comprehensive Metabolic Panel; Future  - CBC with Auto Differential; Future    2. Positive [facility] (antinuclear antibody)  Hx positive [facility], will recheck. Did have normal [facility] following.   - TSH With Reflex Ft4; Future  - [facility] Screen With Reflex; Future  - Sedimentation Rate; Future  - C-Reactive Protein; Future  - Comprehensive Metabolic Panel; Future  - CBC with Auto Differential; Future    3. Environmental and seasonal allergies  Mold and food allergy panel ordered, recommend skin testing with allergist  - Mold Comprehensive Panel; Future  - Food Comprehensive Panel; Future  - Comprehensive Metabolic Panel; Future  - CBC with Auto Differential; Future    Return if symptoms worsen or fail to improve.    EmyOllie Gibbons, was evaluated through a synchronous (real-time) audio-video encounter. The patient (or guardian if applicable) is aware that this is a billable service, which includes applicable co-pays. This Virtual Visit was conducted with patient's (and/or legal guardian's) consent. Patient identification was verified, and a caregiver was present when appropriate.   The patient was located at Home: [address]  [address]  [facility] [address]  Provider was located at Facility (Appt Dept): [address] [place], [place] [id number]  Confirm you are appropriately licensed, registered, or certified to deliver care in the state where the patient is located as indicated above. If you are not or unsure, please re-schedule the visit: Yes, I confirm.       Total time spent on this

## 2024-07-26 ENCOUNTER — HOSPITAL ENCOUNTER (OUTPATIENT)
Age: 44
Setting detail: SPECIMEN
Discharge: HOME | End: 2024-07-26

## 2024-07-26 DIAGNOSIS — J30.89 ENVIRONMENTAL AND SEASONAL ALLERGIES: ICD-10-CM

## 2024-07-26 DIAGNOSIS — R53.83 FATIGUE, UNSPECIFIED TYPE: ICD-10-CM

## 2024-07-26 DIAGNOSIS — R76.8 POSITIVE ANA (ANTINUCLEAR ANTIBODY): ICD-10-CM

## 2024-07-26 LAB
A ALTERNATA IGE QN: NORMAL KU/L (ref 0–0.34)
A FUMIGATUS IGE QN: NORMAL KU/L (ref 0–0.34)
ALBUMIN SERPL-MCNC: 4.4 G/DL (ref 3.5–5.2)
ALBUMIN/GLOB SERPL: 1 {RATIO} (ref 1–2.5)
ALLERGEN ASPERGILLUS NIGER IGE: NORMAL KU/L (ref 0–0.34)
ALLERGEN CEPHALOSPORIUM ACREMONIUM IGE: NORMAL KU/L (ref 0–0.34)
ALLERGEN TRICHOPHYTON RUBRUM IGE: NORMAL KU/L (ref 0–0.34)
ALP SERPL-CCNC: 96 U/L (ref 35–104)
ALT SERPL-CCNC: 18 U/L (ref 10–35)
ANION GAP SERPL CALCULATED.3IONS-SCNC: 11 MMOL/L (ref 9–16)
AST SERPL-CCNC: 21 U/L (ref 10–35)
BARLEY IGE QN: NORMAL KU/L (ref 0–0.34)
BASOPHILS # BLD: <0.03 K/UL (ref 0–0.2)
BASOPHILS NFR BLD: 0 % (ref 0–2)
BEEF IGE QN: NORMAL KU/L (ref 0–0.34)
BILIRUB SERPL-MCNC: 0.3 MG/DL (ref 0–1.2)
BUN SERPL-MCNC: 17 MG/DL (ref 6–20)
C ALBICANS IGE QN: NORMAL KU/L (ref 0–0.34)
C HERBARUM IGE QN: NORMAL KUL/L (ref 0–0.34)
C LUNATA IGE QN: NORMAL KU/L (ref 0–0.34)
CABBAGE IGE QN: NORMAL KU/L (ref 0–0.34)
CALCIUM SERPL-MCNC: 9.5 MG/DL (ref 8.6–10.4)
CARROT IGE QN: NORMAL KU/L (ref 0–0.34)
CHICKEN MEAT IGE QN: NORMAL KU/L (ref 0–0.34)
CHLORIDE SERPL-SCNC: 104 MMOL/L (ref 98–107)
CO2 SERPL-SCNC: 24 MMOL/L (ref 20–31)
CODFISH IGE QN: NORMAL KU/L (ref 0–0.34)
CORN IGE QN: NORMAL KU/L (ref 0–0.34)
COW MILK IGE QN: NORMAL KU/L (ref 0–0.34)
CRAB IGE QN: NORMAL KU/L (ref 0–0.34)
CREAT SERPL-MCNC: 0.8 MG/DL (ref 0.5–0.9)
CRP SERPL HS-MCNC: <3 MG/L (ref 0–5)
E PURPURASCENS IGE QN: NORMAL KU/L (ref 0–0.34)
EGG WHITE IGE QN: NORMAL KU/L (ref 0–0.34)
EOSINOPHIL # BLD: 0.21 K/UL (ref 0–0.44)
EOSINOPHILS RELATIVE PERCENT: 3 % (ref 1–4)
ERYTHROCYTE [DISTWIDTH] IN BLOOD BY AUTOMATED COUNT: 13.2 % (ref 11.8–14.4)
ERYTHROCYTE [SEDIMENTATION RATE] IN BLOOD BY PHOTOMETRIC METHOD: 22 MM/HR (ref 0–20)
F MONILIFORME IGE QN: NORMAL KU/L (ref 0–0.34)
GFR, ESTIMATED: >90 ML/MIN/1.73M2
GLUCOSE SERPL-MCNC: 76 MG/DL (ref 74–99)
GRAPE IGE QN: NORMAL KU/L (ref 0–0.34)
HCT VFR BLD AUTO: 39.1 % (ref 36.3–47.1)
HGB BLD-MCNC: 12.8 G/DL (ref 11.9–15.1)
IGE SERPL-ACNC: 16 IU/ML (ref 0–100)
IGE SERPL-ACNC: 16 IU/ML (ref 0–100)
IMM GRANULOCYTES # BLD AUTO: <0.03 K/UL (ref 0–0.3)
IMM GRANULOCYTES NFR BLD: 0 %
LETTUCE IGE QN: NORMAL KU/L (ref 0–0.34)
LYMPHOCYTES NFR BLD: 1.82 K/UL (ref 1.1–3.7)
LYMPHOCYTES RELATIVE PERCENT: 29 % (ref 24–43)
M RACEMOSUS IGE QN: NORMAL KU/L (ref 0–0.34)
MCH RBC QN AUTO: 28.1 PG (ref 25.2–33.5)
MCHC RBC AUTO-ENTMCNC: 32.7 G/DL (ref 28.4–34.8)
MCV RBC AUTO: 85.9 FL (ref 82.6–102.9)
MONOCYTES NFR BLD: 0.44 K/UL (ref 0.1–1.2)
MONOCYTES NFR BLD: 7 % (ref 3–12)
NEUTROPHILS NFR BLD: 61 % (ref 36–65)
NEUTS SEG NFR BLD: 3.81 K/UL (ref 1.5–8.1)
NRBC BLD-RTO: 0 PER 100 WBC
OAT IGE QN: NORMAL KU/L (ref 0–0.34)
ORANGE TREE IGE QN: NORMAL KU/L (ref 0–0.34)
P BETAE IGE QN: NORMAL KU/L (ref 0–0.34)
P NOTATUM IGE QN: NORMAL KU/L (ref 0–0.34)
PAPRIKA IGE QN: NORMAL KU/L (ref 0–0.34)
PEANUT IGE QN: NORMAL KU/L (ref 0–0.34)
PLATELET # BLD AUTO: 284 K/UL (ref 138–453)
PMV BLD AUTO: 11.1 FL (ref 8.1–13.5)
PORK IGE QN: NORMAL KU/L (ref 0–0.34)
POTASSIUM SERPL-SCNC: 4.2 MMOL/L (ref 3.7–5.3)
POTATO IGE QN: NORMAL KU/L (ref 0–0.34)
PROT SERPL-MCNC: 7.8 G/DL (ref 6.6–8.7)
R NIGRICANS IGE QN: NORMAL KU/L (ref 0–0.34)
RBC # BLD AUTO: 4.55 M/UL (ref 3.95–5.11)
RICE IGE QN: NORMAL KU/L (ref 0–0.34)
RYE IGE QN: NORMAL KU/L (ref 0–0.34)
S BOTRYOSUM IGE QN: NORMAL KU/L (ref 0–0.34)
S ROSTRATA IGE QN: NORMAL KU/L (ref 0–0.34)
SHRIMP IGE QN: NORMAL KU/L (ref 0–0.34)
SODIUM SERPL-SCNC: 139 MMOL/L (ref 136–145)
SOYBEAN IGE QN: NORMAL KU/L (ref 0–0.34)
TOMATO IGE QN: NORMAL KU/L (ref 0–0.34)
TSH SERPL DL<=0.05 MIU/L-ACNC: 1.38 UIU/ML (ref 0.27–4.2)
TUNA IGE QN: NORMAL KU/L (ref 0–0.34)
WBC OTHER # BLD: 6.3 K/UL (ref 3.5–11.3)
WHEAT IGE QN: NORMAL KU/L (ref 0–0.34)
WHITE BEAN IGE QN: NORMAL KU/L (ref 0–0.34)

## 2024-07-30 LAB
ANA SER QL IA: NEGATIVE
DSDNA IGG SER QL IA: 1.4 IU/ML
NUCLEAR IGG SER IA-RTO: 0.2 U/ML

## 2024-07-31 LAB
BARLEY IGE QN: <0.1 KU/L (ref 0–0.34)
BEEF IGE QN: <0.1 KU/L (ref 0–0.34)
CABBAGE IGE QN: <0.1 KU/L (ref 0–0.34)
CARROT IGE QN: <0.1 KU/L (ref 0–0.34)
CHICKEN MEAT IGE QN: <0.1 KU/L (ref 0–0.34)
CODFISH IGE QN: <0.1 KU/L (ref 0–0.34)
CORN IGE QN: <0.1 KU/L (ref 0–0.34)
COW MILK IGE QN: 0.35 KU/L (ref 0–0.34)
CRAB IGE QN: <0.1 KU/L (ref 0–0.34)
EGG WHITE IGE QN: 0.12 KU/L (ref 0–0.34)
GRAPE IGE QN: <0.1 KU/L (ref 0–0.34)
IGE SERPL-ACNC: 16 IU/ML (ref 0–100)
LETTUCE IGE QN: <0.1 KU/L (ref 0–0.34)
OAT IGE QN: <0.1 KU/L (ref 0–0.34)
ORANGE TREE IGE QN: <0.1 KU/L (ref 0–0.34)
PAPRIKA IGE QN: <0.1 KU/L (ref 0–0.34)
PEANUT IGE QN: <0.1 KU/L (ref 0–0.34)
PORK IGE QN: <0.1 KU/L (ref 0–0.34)
POTATO IGE QN: <0.1 KU/L (ref 0–0.34)
RICE IGE QN: <0.1 KU/L (ref 0–0.34)
RYE IGE QN: <0.1 KU/L (ref 0–0.34)
SHRIMP IGE QN: <0.1 KU/L (ref 0–0.34)
SOYBEAN IGE QN: <0.1 KU/L (ref 0–0.34)
TOMATO IGE QN: <0.1 KU/L (ref 0–0.34)
TUNA IGE QN: <0.1 KU/L (ref 0–0.34)
WHEAT IGE QN: <0.1 KU/L (ref 0–0.34)
WHITE BEAN IGE QN: <0.1 KU/L (ref 0–0.34)

## 2024-08-02 LAB
A ALTERNATA IGE QN: <0.1 KU/L (ref 0–0.34)
A FUMIGATUS IGE QN: <0.1 KU/L (ref 0–0.34)
ALLERGEN ASPERGILLUS NIGER IGE: <0.1 KU/L (ref 0–0.34)
ALLERGEN CEPHALOSPORIUM ACREMONIUM IGE: <0.1 KU/L (ref 0–0.34)
ALLERGEN TRICHOPHYTON RUBRUM IGE: <0.1 KU/L (ref 0–0.34)
C ALBICANS IGE QN: <0.1 KU/L (ref 0–0.34)
C HERBARUM IGE QN: <0.1 KUL/L (ref 0–0.34)
C LUNATA IGE QN: <0.1 KU/L (ref 0–0.34)
E PURPURASCENS IGE QN: <0.1 KU/L (ref 0–0.34)
F MONILIFORME IGE QN: <0.1 KU/L (ref 0–0.34)
IGE SERPL-ACNC: 16 IU/ML (ref 0–100)
M RACEMOSUS IGE QN: <0.1 KU/L (ref 0–0.34)
P BETAE IGE QN: <0.1 KU/L (ref 0–0.34)
P NOTATUM IGE QN: <0.1 KU/L (ref 0–0.34)
R NIGRICANS IGE QN: <0.1 KU/L (ref 0–0.34)
S BOTRYOSUM IGE QN: <0.1 KU/L (ref 0–0.34)
S ROSTRATA IGE QN: <0.1 KU/L (ref 0–0.34)

## 2024-08-12 LAB
CHOLEST SERPL-MCNC: 190 MG/DL (ref 0–199)
CHOLESTEROL/HDL RATIO: 4
GLUCOSE SERPL-MCNC: 95 MG/DL (ref 74–99)
HDLC SERPL-MCNC: 52 MG/DL
LDLC SERPL CALC-MCNC: 125 MG/DL (ref 0–100)
PATIENT FASTING?: YES
TRIGL SERPL-MCNC: 66 MG/DL
VLDLC SERPL CALC-MCNC: 13 MG/DL

## 2024-09-10 DIAGNOSIS — F34.1 DYSTHYMIA: ICD-10-CM

## 2024-09-11 RX ORDER — SERTRALINE HYDROCHLORIDE 25 MG/1
TABLET, FILM COATED ORAL
Qty: 90 TABLET | Refills: 1 | OUTPATIENT
Start: 2024-09-11

## 2024-09-11 RX ORDER — SERTRALINE HYDROCHLORIDE 25 MG/1
TABLET, FILM COATED ORAL
Qty: 90 TABLET | Refills: 1 | Status: SHIPPED | OUTPATIENT
Start: 2024-09-11

## 2024-09-23 ENCOUNTER — PATIENT MESSAGE (OUTPATIENT)
Dept: PRIMARY CARE CLINIC | Age: 44
End: 2024-09-23

## 2024-09-23 DIAGNOSIS — F34.1 DYSTHYMIA: ICD-10-CM

## 2024-09-23 RX ORDER — SERTRALINE HYDROCHLORIDE 25 MG/1
TABLET, FILM COATED ORAL
Qty: 90 TABLET | Refills: 1 | Status: SHIPPED | OUTPATIENT
Start: 2024-09-23 | End: 2024-09-24

## 2025-01-02 ENCOUNTER — TRANSCRIBE ORDERS (OUTPATIENT)
Dept: ADMINISTRATIVE | Age: 45
End: 2025-01-02

## 2025-01-02 DIAGNOSIS — Z12.31 OTHER SCREENING MAMMOGRAM: Primary | ICD-10-CM

## 2025-04-28 ENCOUNTER — HOSPITAL ENCOUNTER (OUTPATIENT)
Dept: MAMMOGRAPHY | Age: 45
Discharge: HOME | End: 2025-04-30
Payer: COMMERCIAL

## 2025-04-28 VITALS — BODY MASS INDEX: 30.78 KG/M2 | HEIGHT: 70 IN | WEIGHT: 215 LBS

## 2025-04-28 DIAGNOSIS — Z12.31 VISIT FOR SCREENING MAMMOGRAM: ICD-10-CM

## 2025-04-28 PROCEDURE — 77063 BREAST TOMOSYNTHESIS BI: CPT

## 2025-04-29 ENCOUNTER — RESULTS FOLLOW-UP (OUTPATIENT)
Dept: PRIMARY CARE CLINIC | Age: 45
End: 2025-04-29

## 2025-05-15 LAB
CHOLEST SERPL-MCNC: 211 MG/DL (ref 0–199)
CHOLESTEROL/HDL RATIO: 3.8
GLUCOSE SERPL-MCNC: 91 MG/DL (ref 74–99)
HDLC SERPL-MCNC: 55 MG/DL
LDLC SERPL CALC-MCNC: 146 MG/DL (ref 0–100)
PATIENT FASTING?: YES
TRIGL SERPL-MCNC: 51 MG/DL
VLDLC SERPL CALC-MCNC: 10 MG/DL (ref 1–30)

## (undated) DEVICE — MHPB GYN MINOR PACK: Brand: MEDLINE INDUSTRIES, INC.

## (undated) DEVICE — GLOVE ORANGE PI 7   MSG9070

## (undated) DEVICE — DRAPE,UNDRBUT,WHT GRAD PCH,CAPPORT,20/CS: Brand: MEDLINE

## (undated) DEVICE — FLUID MGMT SYS FLUENT KIT 6/PK

## (undated) DEVICE — DRAPE,REIN 53X77,STERILE: Brand: MEDLINE

## (undated) DEVICE — UNDERPANTS MAT L/XL KNIT SEAMLESS CLR CODE WAISTBAND

## (undated) DEVICE — DEVICE TISS REM IU CANSTR VAC TB FT PEDAL DISPOSABLE MYOSURE

## (undated) DEVICE — PAD,SANITARY,11 IN,MAXI,W/WINGS,N-STRL: Brand: MEDLINE

## (undated) DEVICE — 1016 S-DRAPE IRRIG POUCH 10/BOX: Brand: STERI-DRAPE™

## (undated) DEVICE — SOLUTION IV IRRIG POUR BRL 0.9% SODIUM CHL 2F7124

## (undated) DEVICE — SET ENDOSCP SEAL HYSTEROSCOPE RIG OUTFLO CHN DISP MYOSURE

## (undated) DEVICE — SOLUTION IRRIG 3000ML 0.9% SOD CHL USP UROMATIC PLAS CONT